# Patient Record
(demographics unavailable — no encounter records)

---

## 2018-05-21 NOTE — EDM.PDOC
ED HPI GENERAL MEDICAL PROBLEM





- General


Chief Complaint: Respiratory Problem


Stated Complaint: CHEST CONGESTION AND ASTHMA


Time Seen by Provider: 05/21/18 08:37





- History of Present Illness


INITIAL COMMENTS - FREE TEXT/NARRATIVE: 





74-year-old male presents to the emergency room with a worsening cough.





Patient is a worsening cough over the last 5-6 days. He is bringing up 

increasing amounts of sputum. The patient is concerned as he has a history of 

asthma he uses Flovent as directed. He is not on any bronchodilator therapy. He 

denies any fevers chills. He thinks his asthma is fairly stable at this point. 

He denies any other symptoms at this point he is not having chest pain chest 

pressure. He denies any abdominal pain nausea vomiting constipation diarrhea.








- Related Data


 Allergies











Allergy/AdvReac Type Severity Reaction Status Date / Time


 


No Known Allergies Allergy   Verified 05/21/18 08:29











Home Meds: 


 Home Meds





Albuterol [Proventil HFA] 200 puff INH Q4H #1 inhaler 05/21/18 [Rx]


Doxycycline [Vibramycin] 100 mg PO BID #20 cap 05/21/18 [Rx]


Fluticasone Propionate [Flovent  MCG] 1 puff INH BID 05/21/18 [History]


Ibuprofen [Motrin] 400 - 600 mg PO BEDTIME PRN 05/21/18 [History]











Social & Family History





- Tobacco Use


Smoking Status *Q: Former Smoker


Years of Tobacco use: 10


Packs/Tins Daily: 0.5


Used Tobacco, but Quit: No





- Caffeine Use


Caffeine Use: Reports: None, Coffee





- Alcohol Use


Days Per Week of Alcohol Use: 7


Number of Drinks Per Day: 1


Total Drinks Per Week: 7





- Recreational Drug Use


Recreational Drug Use: No





ED ROS GENERAL





- Review of Systems


Review Of Systems: See Below


Constitutional: Denies: Fever, Chills


HEENT: Reports: No Symptoms


Respiratory: Reports: Cough, Sputum


Cardiovascular: Reports: No Symptoms


GI/Abdominal: Reports: No Symptoms


: Reports: No Symptoms





ED EXAM, GENERAL





- Physical Exam


Exam: See Below


Exam Limited By: No Limitations


General Appearance: Alert, No Apparent Distress


Eye Exam: Bilateral Eye: Normal Inspection


Ears: Normal External Exam, Normal Canal, Hearing Grossly Normal, Normal TMs


Nose: Normal Inspection, Normal Mucosa, No Blood


Throat/Mouth: Normal Inspection, Normal Lips, Normal Teeth, Normal Gums, Normal 

Oropharynx, Normal Voice, No Airway Compromise


Head: Atraumatic, Normocephalic


Neck: Normal Inspection, Supple, Non-Tender, Full Range of Motion


Respiratory/Chest: No Respiratory Distress, Lungs Clear, Normal Breath Sounds


Cardiovascular: Normal Peripheral Pulses, Regular Rate, Rhythm, No Edema, No 

Gallop, No JVD, No Murmur, No Rub


GI/Abdominal: Normal Bowel Sounds, Soft, Non-Tender


Extremities: Normal Inspection, Normal Range of Motion, Non-Tender, No Pedal 

Edema


Neurological: Alert, Oriented


Skin Exam: Warm, Dry, Intact





Course





- Vital Signs


Last Recorded V/S: 


 Last Vital Signs











Temp  36.8 C   05/21/18 08:25


 


Pulse  89   05/21/18 08:25


 


Resp  19   05/21/18 08:25


 


BP  176/106 H  05/21/18 08:25


 


Pulse Ox  96   05/21/18 09:16














- Orders/Labs/Meds


Orders: 


 Active Orders 24 hr











 Category Date Time Status


 


 RT Aerosol Therapy [RC] ASDIRECTED Care  05/21/18 08:52 Active


 


 Chest 2V [CR] Stat Exams  05/21/18 08:51 Taken











Meds: 


Medications














Discontinued Medications














Generic Name Dose Route Start Last Admin





  Trade Name Freq  PRN Reason Stop Dose Admin


 


Albuterol/Ipratropium  3 ml  05/21/18 08:51  05/21/18 09:16





  Duoneb 3.0-0.5 Mg/3 Ml  NEB  05/21/18 08:52  3 ml





  ONETIME ONE   Administration





     





     





     





     














- Re-Assessments/Exams


Free Text/Narrative Re-Assessment/Exam: 





05/21/18 09:30


Chest x-ray was obtained which shows slight rotation mild hyperinflation what 

looks like a developing right middle lobe infiltrate right hemidiaphragm is 

slightly elevated compared to the left he has some developing degenerative 

changes noted in the spine.





Patient doing much better after the nebulizer treatment. Repeat auscultation of 

the lungs reveals increased air movement no wheezes crackles or rhonchi.








Patient be discged on doxycycline and albuterol MDI














Departure





- Departure


Time of Disposition: 09:35


Disposition: Home, Self-Care 01


Clinical Impression: 


 Pneumonia








- Discharge Information


Prescriptions: 


Albuterol [Proventil HFA] 200 puff INH Q4H #1 inhaler


Doxycycline [Vibramycin] 100 mg PO BID #20 cap


Referrals: 


Willie Vincent Jr, MD [Primary Care Provider] - 


Forms:  ED Department Discharge


Additional Instructions: 


Return to the emergency room with any questions problems worsening symptoms.





Use the inhaler the antibiotics as directed. Use the inhaler for at least one 

week after you finish the antibiotics





Follow-up with regular physician at the end of this week for recheck.











- My Orders


Last 24 Hours: 


My Active Orders





05/21/18 08:51


Chest 2V [CR] Stat 





05/21/18 08:52


RT Aerosol Therapy [RC] ASDIRECTED 














- Assessment/Plan


Last 24 Hours: 


My Active Orders





05/21/18 08:51


Chest 2V [CR] Stat 





05/21/18 08:52


RT Aerosol Therapy [RC] ASDIRECTED

## 2018-05-22 NOTE — CR
Chest: Two views of the chest were obtained.

 

Comparison: Previous chest x-ray of 08/26/12.

 

Heart size is normal.  Tortuous thoracic aorta is seen.  Lungs are 

clear with no acute parenchymal change.  Bony structures show 

degenerative spurring within the spine.  Mild compression deformity is

 noted within the upper thoracic spine which is old.  Diaphragms are 

flattened on the lateral view compatible with emphysematous change.

 

Impression:

1.  Emphysematous change.  Other incidental findings.

2.  Nothing acute is appreciated.

 

Diagnostic code #2

## 2020-05-15 NOTE — EDM.PDOC
ED HPI GENERAL MEDICAL PROBLEM





- General


Chief Complaint: Skin Complaint


Stated Complaint: ITCHING WITH SORES


Time Seen by Provider: 05/15/20 11:16


Source of Information: Reports: Patient, RN Notes Reviewed


History Limitations: Reports: No Limitations





- History of Present Illness


INITIAL COMMENTS - FREE TEXT/NARRATIVE: 





Patient is a 76-year-old male who presents to the ED for a skin issue.  Patient 

states for the last 8 months he has had issues with an allover skin rash with 

eruptions.  He states that the rash itself is very very itchy, and is all over, 

this does spare his palms and soles of his feet, and apparently his groin area.

  He notes that he is seen 4 different dermatologists in [place], that have 

given him different creams to try to clear this up however this is not 

providing much relief.  Patient notes that if he gets warm, or heat is applied 

to the areas they seem to be more painful, and get more itchy.  There are 

multiple scab-like lesions throughout his entire body surface, bilateral upper 

extremities, lower legs, chest, back.  Multiple excoriation marks are noted 

throughout the skin surface.  The patient himself denies any sort of fever/

chills, or any other sick-like symptoms, cough/shortness of breath, chest pain, 

otherwise.  He states that most of his concern is the itching and spread of the 

rash.  His primary care provider is Dr. Vincent.





- Related Data


 Allergies











Allergy/AdvReac Type Severity Reaction Status Date / Time


 


No Known Allergies Allergy   Verified 05/15/20 11:28











Home Meds: 


 Home Meds





Albuterol [Proventil HFA] 200 puff INH Q4H #1 inhaler 05/21/18 [Rx]


Doxycycline [Vibramycin] 100 mg PO BID #20 cap 05/21/18 [Rx]


Fluticasone Propionate [Flovent  MCG] 1 puff INH BID 05/21/18 [History]


Ibuprofen [Motrin] 400 - 600 mg PO BEDTIME PRN 05/21/18 [History]


predniSONE 20 mg PO ASDIRECTED #15 tab 05/15/20 [Rx]











Past Medical History


HEENT History: Reports: Allergic Rhinitis


Respiratory History: Reports: Asthma


Gastrointestinal History: Reports: Other (See Below)


Psychiatric History: Reports: Anxiety





- Past Surgical History


Male  Surgical History: Reports: Prostatectomy





Social & Family History





- Tobacco Use


Smoking Status *Q: Never Smoker





- Caffeine Use


Caffeine Use: Reports: None





- Recreational Drug Use


Recreational Drug Use: No





ED ROS GENERAL





- Review of Systems


Review Of Systems: Comprehensive ROS is negative, except as noted in HPI.





ED EXAM, SKIN/RASH


Exam: See Below


Exam Limited By: No Limitations


General Appearance: Alert, WD/WN, No Apparent Distress


Eye Exam: Bilateral Eye: Normal Inspection


Ears: Normal External Exam


Nose: Normal Inspection


Throat/Mouth: Normal Inspection, Normal Lips, Normal Teeth, Normal Gums, Normal 

Oropharynx, Normal Voice, No Airway Compromise


Head: Atraumatic, Normocephalic


Neck: Normal Inspection, Supple


Respiratory/Chest: No Respiratory Distress, Lungs Clear, Normal Breath Sounds, 

No Accessory Muscle Use, Chest Non-Tender


Cardiovascular: Normal Peripheral Pulses, Regular Rate, Rhythm, No Murmur


GI/Abdominal: Normal Bowel Sounds, Soft, Non-Tender, No Distention, No Mass


Extremities: Normal Range of Motion, Normal Capillary Refill, Arm Pain (with 

pruritis), Leg Pain (with pruritis), Increased Warmth, Redness


Neurological: Alert, Oriented, Normal Cognition, No Motor/Sensory Deficits


Psychiatric: Normal Affect, Normal Mood


Skin: Warm, Dry


Location, Skin: Neck, Chest, Abdomen, Back, Upper Extremity, Right, Upper 

Extremity, Left, Lower Extremity, Right, Lower Extremity, Left, Generalized.  No

: Palms, Soles, Axillary, Groin


Characteristics: Maculopapular, Fine, Urticarial, Erythematous


Associated features: Crusting (multiple open sores in different stages of 

healing throughout entire body surface noted, excoriation marks noted in 

multiple areas as well.).  No: Scaling





Course





- Vital Signs


Last Recorded V/S: 


 Last Vital Signs











Temp  98.5 F   05/15/20 11:18


 


Pulse  95   05/15/20 11:18


 


Resp  16   05/15/20 11:18


 


BP  185/116 H  05/15/20 11:18


 


Pulse Ox  95   05/15/20 11:18














- Orders/Labs/Meds


Orders: 


 Active Orders 24 hr











 Category Date Time Status


 


 CBC WITH MANUAL DIFF [HEME] Stat Lab  05/15/20 12:06 Received











Labs: 


 Laboratory Tests











  05/15/20 05/15/20 Range/Units





  12:06 12:06 


 


WBC  6.56   (4.23-9.07)  K/mm3


 


RBC  5.43   (4.63-6.08)  M/mm3


 


Hgb  16.2   (13.7-17.5)  gm/dl


 


Hct  50.3   (40.1-51.0)  %


 


MCV  92.6 H   (79.0-92.2)  fl


 


MCH  29.8   (25.7-32.2)  pg


 


MCHC  32.2   (32.2-35.5)  g/dl


 


RDW Std Deviation  46.9 H   (35.1-43.9)  fL


 


Plt Count  230   (163-337)  K/mm3


 


MPV  10.0   (9.4-12.3)  fl


 


Sodium   141  (136-145)  mEq/L


 


Potassium   4.0  (3.5-5.1)  mEq/L


 


Chloride   106  ()  mEq/L


 


Carbon Dioxide   25  (21-32)  mEq/L


 


Anion Gap   14.0  (5-15)  


 


BUN   23 H  (7-18)  mg/dL


 


Creatinine   0.8  (0.7-1.3)  mg/dL


 


Est Cr Clr Drug Dosing   81.11  mL/min


 


Estimated GFR (MDRD)   > 60  (>60)  mL/min


 


BUN/Creatinine Ratio   28.8 H  (14-18)  


 


Glucose   107  ()  mg/dL


 


Calcium   9.2  (8.5-10.1)  mg/dL


 


Total Bilirubin   0.4  (0.2-1.0)  mg/dL


 


AST   21  (15-37)  U/L


 


ALT   29  (16-63)  U/L


 


Alkaline Phosphatase   121 H  ()  U/L


 


Total Protein   7.5  (6.4-8.2)  g/dl


 


Albumin   3.5  (3.4-5.0)  g/dl


 


Globulin   4.0  gm/dL


 


Albumin/Globulin Ratio   0.9 L  (1-2)  














- Re-Assessments/Exams


Free Text/Narrative Re-Assessment/Exam: 





05/15/20 11:44


Patient presents to the ED for his skin complaint.  Unsure as to the etiology 

of this rash.  Patient states he was told by the dermatologists that he had 

"human scabies".  There are no marks on his hands and/or feet that would 

suggest scabies at this time.  The rash is fairly generalized, very pruritic, 

with multiple open sores.  Have ordered CBC and CMP for initial evaluation, 

suspect underlying cellulitis with possible allover inflammatory type reaction.

  We will likely try to send the patient home with prednisone, and or a course 

of antibiotics pending lab results.  Will strongly recommend he follow-up with 

his regular provider and get a referral for a different dermatologist or 

otherwise.





05/15/20 12:51


Patient CBC demonstrates no sign of an obvious infection.  Metabolic is still 

pending.





05/15/20 13:06


Metabolic panel has returned and is essentially unremarkable, or would not 

demonstrate any cause of the itching/rash. Will discharge home with prednisone 

and recommendation for follow up with primary care.





Departure





- Departure


Time of Disposition: 12:58


Disposition: Home, Self-Care 01


Condition: Good


Clinical Impression: 


 Pruritic rash





Atopic dermatitis


Qualifiers:


 Atopic dermatitis type: other Qualified Code(s): L20.89 - Other atopic 

dermatitis








- Discharge Information


*PRESCRIPTION DRUG MONITORING PROGRAM REVIEWED*: No


*COPY OF PRESCRIPTION DRUG MONITORING REPORT IN PATIENT [place]: No


Prescriptions: 


predniSONE 20 mg PO ASDIRECTED #15 tab


Instructions:  Rash, Adult, Easy-to-Read


Referrals: 


Willie Vincent Jr, MD [Primary Care Provider] - 


Forms:  ED Department Discharge


Additional Instructions: 


You were seen in the ED today for your all over body rash.





The cause of the rash is still pretty unclear at today's visit.  However he did 

have some lab work taken and there does not appear to be any sort of underlying 

bacterial infection.





You have been started on a course of prednisone for the itching and to help 

calm down the rash.  Please take as prescribed this was electronically 

prescribed to the [place] pharmacy located in the [facility] grocery store.  You can 

start taking this tonight.





Highly recommend you follow-up with your primary care provider for a referral 

to dermatology for further care and treatment.





You may also try over-the-counter Benadryl for further itching, if 1 full tab 

makes you too sleepy, you can try half a tablet every 4-6 hours.





Please return to the ER at any time if symptoms should change or worsen.





Sepsis Event Note





- Evaluation


Sepsis Screening Result: No Definite Risk





- Focused Exam


Vital Signs: 


 Vital Signs











  Temp Pulse Resp BP Pulse Ox


 


 05/15/20 11:18  98.5 F  95  16  185/116 H  95











Date Exam was Performed: 05/15/20


Time Exam was Performed: 13:06





- My Orders


Last 24 Hours: 


My Active Orders





05/15/20 12:06


CBC WITH MANUAL DIFF [HEME] Stat 














- Assessment/Plan


Last 24 Hours: 


My Active Orders





05/15/20 12:06


CBC WITH MANUAL DIFF [HEME] Stat

## 2020-10-22 NOTE — PCM.HP.2
H&P History of Present Illness





- General


Date of Service: 10/22/20


Admit Problem/Dx: 


                           Admission Diagnosis/Problem





Admission Diagnosis/Problem      Pneumonia








Source of Information: Patient


History Limitations: Reports: No Limitations





- History of Present Illness


Initial Comments - Free Text/Narative: 





Patient is a 77-year-old gentleman who has presented to the emergency department

with a complaint of coughing, shortness of breath and he was concerned about 

having COVID-19.  The patient reports that approximately 3 days ago he started 

feeling sick with fever, increasing cough and shortness of breath.  The patient 

has been in his usual state of health.  He does not take any medication 

chronically except for inhalers.  Patient does have medications that he takes 

for bullous pemphigus but is not listed on his medication list.  Patient also 

has a history of prostate cancer with radical prostatectomy.  Patient has no 

other complaints today.


Onset of Symptoms: Reports: Gradual


Duration of Symptoms: Reports: Day(s):, Getting Worse


Location: Reports: Generalized


Quality: Reports: Ache


Severity: Mild


Improves with: Reports: Rest


Worsens with: Reports: Breathing


Context: Reports: Sick Contact


Associated Symptoms: Reports: cough w sputum, Fever/Chills, Headaches





- Related Data


Allergies/Adverse Reactions: 


                                    Allergies











Allergy/AdvReac Type Severity Reaction Status Date / Time


 


No Known Allergies Allergy   Verified 05/15/20 11:28











Home Medications: 


                                    Home Meds





Fluticasone Propionate [Flovent  MCG] 1 puff INH BID 05/21/18 [History]


Albuterol [Proventil HFA] 1 puff INH Q4H 10/22/20 [History]


Azithromycin [Zithromax] 250 mg PO DAILY #6 tab 10/22/20 [Rx]











Past Medical History


HEENT History: Reports: Allergic Rhinitis


Respiratory History: Reports: Asthma


Gastrointestinal History: Reports: Other (See Below)


Genitourinary History: Reports: Other (See Below)


Other Genitourinary History: prostate surgery


Psychiatric History: Reports: Anxiety


Other Psychiatric History: utilizes chewing tobacco


Oncologic (Cancer) History: Reports: Prostate


Dermatologic History: Reports: Other (See Below)


Other Dermatologic History: [email or web address]





- Past Surgical History


Male  Surgical History: Reports: Prostatectomy


Dermatological Surgical History: Reports: Skin Biopsy, Skin Graft





Social & Family History





- Family History


Family Medical History: Noncontributory





- Tobacco Use


Tobacco Use Status *Q: Current Every Day Tobacco User


Tobacco Use Within Last Twelve Months: Smokeless Tobacco


Years of Tobacco use: 47


Packs/Tins Daily: 0.5


Used Tobacco, but Quit: No





- Caffeine Use


Caffeine Use: Reports: None





- Alcohol Use


Date of Last Drink: 10/21/20


Time of Last Drink: 20:00





- Living Situation & Occupation


Living situation: Reports: , with Spouse


Occupation: Retired





H&P Review of Systems





- Review of Systems:


Review Of Systems: See Below


General: Reports: Fever, Malaise, Weakness


HEENT: Reports: No Symptoms


Pulmonary: Reports: Shortness of Breath, Wheezing, Cough, Sputum


Cardiovascular: Reports: Dyspnea on Exertion


Gastrointestinal: Reports: No Symptoms


Genitourinary: Reports: No Symptoms


Musculoskeletal: Reports: No Symptoms


Skin: Reports: No Symptoms


Psychiatric: Reports: No Symptoms


Neurological: Reports: No Symptoms


Hematologic/Lymphatic: Reports: No Symptoms


Immunologic: Reports: No Symptoms





Exam





- Exam


Exam: See Below





- Vital Signs


Vital Signs: 


                                Last Vital Signs











Temp  37.1 C   10/22/20 16:04


 


Pulse  96   10/22/20 16:04


 


Resp  40 H  10/22/20 16:04


 


BP  147/83 H  10/22/20 16:04


 


Pulse Ox  91 L  10/22/20 16:04











Weight: 90.718 kg





- Exam


Quality Assessment: Supplemental Oxygen, DVT Prophylaxis


General: Alert, Oriented, Cooperative


HEENT: Conjunctiva Clear, EACs Clear, EOMI, Hearing Intact, Mucosa Moist & Pink


Neck: Supple, Trachea Midline


Lungs: Normal Respiratory Effort, Rales (Bibasilar, predominantly on right side)


Cardiovascular: Regular Rate, Regular Rhythm


GI/Abdominal Exam: Normal Bowel Sounds, Soft, Non-Tender, No Distention


 (Male) Exam: Deferred


Rectal (Males) Exam: Deferred


Back Exam: Normal Inspection, Full Range of Motion


Extremities: Normal Inspection, Normal Range of Motion, No Pedal Edema


Skin: Warm, Rash


Neurological: Cranial Nerves Intact


Neuro Extensive - Mental Status: Alert, Oriented x3


Psychiatric: Alert, Normal Affect, Normal Mood





- Patient Data


Lab Results Last 24 hrs: 


                         Laboratory Results - last 24 hr











  10/22/20 10/22/20 10/22/20 Range/Units





  13:00 13:00 13:00 


 


WBC  15.86 H    (4.23-9.07)  K/mm3


 


RBC  5.15    (4.63-6.08)  M/mm3


 


Hgb  15.2    (13.7-17.5)  gm/dl


 


Hct  48.6    (40.1-51.0)  %


 


MCV  94.4 H    (79.0-92.2)  fl


 


MCH  29.5    (25.7-32.2)  pg


 


MCHC  31.3 L    (32.2-35.5)  g/dl


 


RDW Std Deviation  46.1 H    (35.1-43.9)  fL


 


Plt Count  279    (163-337)  K/mm3


 


MPV  10.0    (9.4-12.3)  fl


 


Neut % (Auto)  86.0 H    (34.0-67.9)  %


 


Lymph % (Auto)  5.5 L    (21.8-53.1)  %


 


Mono % (Auto)  8.1    (5.3-12.2)  %


 


Eos % (Auto)  0 L    (0.8-7.0)  


 


Baso % (Auto)  0.2    (0.1-1.2)  %


 


Neut # (Auto)  13.64 H    (1.78-5.38)  K/mm3


 


Lymph # (Auto)  0.88 L    (1.32-3.57)  K/mm3


 


Mono # (Auto)  1.28 H    (0.30-0.82)  K/mm3


 


Eos # (Auto)  0.00 L    (0.04-0.54)  K/mm3


 


Baso # (Auto)  0.03    (0.01-0.08)  K/mm3


 


Manual Slide Review  Abnormal smear    


 


PT   10.9   (9.7-11.7)  SECONDS


 


INR   1.02   


 


APTT   26   (22-31)  SECONDS


 


D-Dimer, Quantitative   1.79 H   (0.19-0.50)  mg/L


 


Sodium    142  (136-145)  mEq/L


 


Potassium    3.8  (3.5-5.1)  mEq/L


 


Chloride    105  ()  mEq/L


 


Carbon Dioxide    25  (21-32)  mEq/L


 


Anion Gap    15.8 H  (5-15)  


 


BUN    11  (7-18)  mg/dL


 


Creatinine    0.8  (0.7-1.3)  mg/dL


 


Est Cr Clr Drug Dosing    79.84  mL/min


 


Estimated GFR (MDRD)    > 60  (>60)  mL/min


 


BUN/Creatinine Ratio    13.8 L  (14-18)  


 


Glucose    120 H  ()  mg/dL


 


Lactic Acid     (0.4-2.0)  mmol/L


 


Calcium    9.3  (8.5-10.1)  mg/dL


 


Ferritin     ()  ng/ml


 


Total Bilirubin    0.7  (0.2-1.0)  mg/dL


 


AST    19  (15-37)  U/L


 


ALT    40  (16-63)  U/L


 


Alkaline Phosphatase    162 H  ()  U/L


 


Lactate Dehydrogenase    144  ()  U/L


 


C-Reactive Protein    5.8 H*  (<1.0)  mg/dL


 


Total Protein    7.6  (6.4-8.2)  g/dl


 


Albumin    3.6  (3.4-5.0)  g/dl


 


Globulin    4.0  gm/dL


 


Albumin/Globulin Ratio    0.9 L  (1-2)  


 


SARS-CoV-2 RNA (DANYELL)     (NEGATIVE)  














  10/22/20 10/22/20 10/22/20 Range/Units





  13:00 13:00 13:00 


 


WBC     (4.23-9.07)  K/mm3


 


RBC     (4.63-6.08)  M/mm3


 


Hgb     (13.7-17.5)  gm/dl


 


Hct     (40.1-51.0)  %


 


MCV     (79.0-92.2)  fl


 


MCH     (25.7-32.2)  pg


 


MCHC     (32.2-35.5)  g/dl


 


RDW Std Deviation     (35.1-43.9)  fL


 


Plt Count     (163-337)  K/mm3


 


MPV     (9.4-12.3)  fl


 


Neut % (Auto)     (34.0-67.9)  %


 


Lymph % (Auto)     (21.8-53.1)  %


 


Mono % (Auto)     (5.3-12.2)  %


 


Eos % (Auto)     (0.8-7.0)  


 


Baso % (Auto)     (0.1-1.2)  %


 


Neut # (Auto)     (1.78-5.38)  K/mm3


 


Lymph # (Auto)     (1.32-3.57)  K/mm3


 


Mono # (Auto)     (0.30-0.82)  K/mm3


 


Eos # (Auto)     (0.04-0.54)  K/mm3


 


Baso # (Auto)     (0.01-0.08)  K/mm3


 


Manual Slide Review     


 


PT     (9.7-11.7)  SECONDS


 


INR     


 


APTT     (22-31)  SECONDS


 


D-Dimer, Quantitative     (0.19-0.50)  mg/L


 


Sodium     (136-145)  mEq/L


 


Potassium     (3.5-5.1)  mEq/L


 


Chloride     ()  mEq/L


 


Carbon Dioxide     (21-32)  mEq/L


 


Anion Gap     (5-15)  


 


BUN     (7-18)  mg/dL


 


Creatinine     (0.7-1.3)  mg/dL


 


Est Cr Clr Drug Dosing     mL/min


 


Estimated GFR (MDRD)     (>60)  mL/min


 


BUN/Creatinine Ratio     (14-18)  


 


Glucose     ()  mg/dL


 


Lactic Acid  1.4    (0.4-2.0)  mmol/L


 


Calcium     (8.5-10.1)  mg/dL


 


Ferritin    324  ()  ng/ml


 


Total Bilirubin     (0.2-1.0)  mg/dL


 


AST     (15-37)  U/L


 


ALT     (16-63)  U/L


 


Alkaline Phosphatase     ()  U/L


 


Lactate Dehydrogenase     ()  U/L


 


C-Reactive Protein     (<1.0)  mg/dL


 


Total Protein     (6.4-8.2)  g/dl


 


Albumin     (3.4-5.0)  g/dl


 


Globulin     gm/dL


 


Albumin/Globulin Ratio     (1-2)  


 


SARS-CoV-2 RNA (DANYELL)   Negative   (NEGATIVE)  











Result Diagrams: 


                                 10/22/20 13:00





                                 10/22/20 13:00





Sepsis Event Note





- Evaluation


Sepsis Screening Result: No Definite Risk





- Focused Exam


Vital Signs: 


                                   Vital Signs











  Temp Pulse Resp BP Pulse Ox


 


 10/22/20 16:04  37.1 C  96  40 H  147/83 H  91 L


 


 10/22/20 15:34  36.8 C  90  20  147/83 H  96


 


 10/22/20 11:58  36.7 C  98  29 H  160/94 H  92 L














- Problem List


(1) Acute and chronic respiratory failure


SNOMED Code(s): 45573186


   ICD Code: J96.20 - ACUTE AND CHR RESP FAILURE, UNSP W HYPOXIA OR HYPERCAPNIA 

  Status: Chronic   Priority: High   Current Visit: Yes   


Qualifiers: 


   Respiratory failure complication: hypoxia   Qualified Code(s): J96.21 - Acute

 and chronic respiratory failure with hypoxia   





(2) Chronic bronchitis with acute exacerbation


SNOMED Code(s): 460442692


   ICD Code: J20.9 - ACUTE BRONCHITIS, UNSPECIFIED; J42 - UNSPECIFIED CHRONIC 

BRONCHITIS   Status: Acute   Priority: High   Current Visit: Yes   





(3) Pneumonia


SNOMED Code(s): 326767411


   ICD Code: J18.9 - PNEUMONIA, UNSPECIFIED ORGANISM   Status: Acute   Priority:

 High   Current Visit: Yes   


Qualifiers: 


   Pneumonia type: due to unspecified organism   Laterality: right   Lung 

location: unspecified part of lung   Qualified Code(s): J18.9 - Pneumonia, 

unspecified organism   





(4) Smokeless tobacco use


SNOMED Code(s): 233516831


   ICD Code: Z72.0 - TOBACCO USE   Status: Chronic   Priority: Medium   Current 

Visit: Yes   


Problem List Initiated/Reviewed/Updated: Yes


Orders Last 24hrs: 


                               Active Orders 24 hr











 Category Date Time Status


 


 Patient Status [ADT] Routine ADT  10/22/20 16:54 Ordered


 


 Cardiac Monitoring [RC] .AS DIRECTED Care  10/22/20 12:29 Active


 


 Cardiac Monitoring [RC] INTERMITTENT Care  10/22/20 16:57 Ordered


 


 Oxygen Therapy [RC] PRN Care  10/22/20 12:29 Active


 


 Oxygen Therapy [RC] PRN Care  10/22/20 16:54 Ordered


 


 Peripheral IV Care [RC] .AS DIRECTED Care  10/22/20 12:30 Active


 


 RT Aerosol Therapy [RC] ASDIRECTED Care  10/22/20 16:59 Ordered


 


 RT Post Treatment Assessment [RC] Click to Edit Care  10/22/20 12:31 Active


 


 RT Pre-Treatment Assessment [RC] Click to Edit Care  10/22/20 12:31 Active


 


 Up With Assistance [RC] ASDIRECTED Care  10/22/20 16:54 Ordered


 


 VTE/DVT Education [RC] PER UNIT ROUTINE Care  10/22/20 16:54 Ordered


 


 Vital Signs [RC] Q4H Care  10/22/20 16:54 Ordered


 


 OT Evaluation and Treatment [CONS] Routine Cons  10/22/20 16:54 Ordered


 


 PT Evaluation and Treatment [CONS] Routine Cons  10/22/20 16:54 Ordered


 


 Regular Diet [DIET] Diet  10/22/20 Dinner Ordered


 


 Ang Chest [CT] Stat Exams  10/22/20 14:12 Taken


 


 Chest 1V Frontal [CR] Stat Exams  10/22/20 12:30 Taken


 


 CBC WITH AUTO DIFF [HEME] AM Lab  10/23/20 05:11 Ordered


 


 COMPREHENSIVE METABOLIC PN,CMP [CHEM] AM Lab  10/23/20 05:11 Ordered


 


 CULTURE SPUTUM + SMEAR [RM] Stat Lab  10/22/20 16:54 Ordered


 


 MAGNESIUM [CHEM] AM Lab  10/23/20 05:11 Ordered


 


 PHOSPHORUS [CHEM] AM Lab  10/23/20 05:11 Ordered


 


 Acetaminophen [TylenoL] Med  10/22/20 16:54 Ordered





 650 mg PO Q4H PRN   


 


 Albuterol/Ipratropium [DuoNeb 3.0-0.5 MG/3 ML] Med  10/22/20 16:54 Ordered





 3 ml NEB Q4H PRN   


 


 Azithromycin [Zithromax] Med  10/22/20 17:00 Ordered





 500 mg IV Q24H   


 


 Docusate Sodium [Colace] Med  10/22/20 16:54 Ordered





 100 mg PO BID PRN   


 


 Enoxaparin [Lovenox] Med  10/22/20 17:00 Ordered





 40 mg SUBCUT DAILY   


 


 Hydrocortisone Sod Succinate [Solu-CORTEF] Med  10/22/20 17:00 Ordered





 125 mg IVPUSH Q8H   


 


 Nicotine [Habitrol] Med  10/23/20 09:00 Ordered





 14 mg TRDERM DAILY   


 


 Sodium Chloride 0.9% [Normal Saline] 1,000 ml Med  10/22/20 17:00 Ordered





 IV ASDIRECTED   


 


 Sodium Chloride 0.9% [Normal Saline] 100 ml Med  10/22/20 14:15 Active





 IV ASDIRECTED   


 


 Sodium Chloride 0.9% [Saline Flush] Med  10/22/20 12:29 Active





 10 ml FLUSH ASDIRECTED PRN   


 


 Sodium Chloride 0.9% [Saline Flush] Med  10/22/20 14:15 Active





 10 ml FLUSH ONETIME PRN   


 


 Temazepam [Restoril] Med  10/22/20 16:54 Ordered





 15 mg PO BEDTIME PRN   


 


 oxyCODONE Med  10/22/20 16:54 Ordered





 5 mg PO Q4H PRN   


 


 Peripheral IV Insertion Adult [OM.PC] Stat Oth  10/22/20 12:29 Ordered


 


 Resuscitation Status Routine Resus Stat  10/22/20 16:54 Ordered








                                Medication Orders





Sodium Chloride (Normal Saline)  100 mls @ 75 mls/hr IV ASDIRECTED LIZ


   Last Admin: 10/22/20 14:36  Dose: 75 mls/hr


   Documented by: DENA


Sodium Chloride (Saline Flush)  10 ml FLUSH ASDIRECTED PRN


   PRN Reason: Keep Vein Open


   Last Admin: 10/22/20 14:36  Dose: 10 ml


   Documented by: DENA


   Admin: 10/22/20 13:55  Dose: 10 ml


   Documented by: DESTINY


Sodium Chloride (Saline Flush)  10 ml FLUSH ONETIME PRN


   PRN Reason: IV FLUSH


   Last Admin: 10/22/20 13:00  Dose: 10 ml


   Documented by: DESTINY








Assessment/Plan Comment:: 





The patient is a 77-year-old gentleman who will be admitted as an inpatient due 

to his pneumonia as well as acute on chronic respiratory failure.  The patient 

will have oxygen support to help keep his saturations above 92%.  I have also 

ordered albuterol Atrovent nebulizers every 2 hours when wheezing.  The patient 

also be kept on IV fluids at 75 mL/h.  Patient also has been placed on Solu-

Medrol  mg every 8 hours.  The patient will also have a azithromycin 500 

mg IV on a daily basis.  I have also strongly counseled the patient with regards

 to smokeless tobacco usage.  He has been offered nicotine patch as needed.  

Patient has refused this.  PT OT is also been ordered for the patient.  Repeat 

laboratory studies have been ordered for the morning.  Patient has been 

encouraged to ambulate.  He will have a regular diet as tolerated.  Patient sh

ould be appropriate for discharge in 2 to 3 days.  The patient is COVID-19 

negative.





- Mortality Measure


Prognosis:: Good

## 2020-10-22 NOTE — EDM.PDOC
ED HPI GENERAL MEDICAL PROBLEM





- General


Chief Complaint: Respiratory Problem


Stated Complaint: COVID SX


Time Seen by Provider: 10/22/20 12:00


Source of Information: Reports: Patient


History Limitations: Reports: No Limitations





- History of Present Illness


INITIAL COMMENTS - FREE TEXT/NARRATIVE: 





The patient presents with a fever, cough, congestion, runny nose and shortness 

of breath.  This has been going on since Monday.  He has a history of chronic 

bronchitis and he is on inhalers for that.  He still smokes.  He has no chest 

pain but he is short of breath with exertion.  He has no abdominal pain, nausea 

or vomiting.  He went to the clinic and they sent him over to be evaluated.  He 

has no cardiac history or hypertension.


Onset: Gradual


Duration: Day(s):


Severity: Moderate


Improves with: Reports: None


Worsens with: Reports: None


Associated Symptoms: Reports: Cough, Fever/Chills, Shortness of Breath.  Denies:

Chest Pain, Headaches, Nausea/Vomiting


Treatments PTA: Reports: Other (see below)


Other Treatments PTA: nyquil last night





- Related Data


                                    Allergies











Allergy/AdvReac Type Severity Reaction Status Date / Time


 


No Known Allergies Allergy   Verified 05/15/20 11:28











Home Meds: 


                                    Home Meds





Fluticasone Propionate [Flovent  MCG] 1 puff INH BID 05/21/18 [History]


Albuterol [Proventil HFA] 1 puff INH Q4H 10/22/20 [History]


Azithromycin [Zithromax] 250 mg PO DAILY #6 tab 10/22/20 [Rx]











Past Medical History


HEENT History: Reports: Allergic Rhinitis


Respiratory History: Reports: Asthma


Gastrointestinal History: Reports: Other (See Below)


Genitourinary History: Reports: Other (See Below)


Other Genitourinary History: prostate surgery


Psychiatric History: Reports: Anxiety


Other Psychiatric History: utilizes chewing tobacco


Oncologic (Cancer) History: Reports: Prostate


Dermatologic History: Reports: Other (See Below)


Other Dermatologic History: [email or web address]





- Past Surgical History


Male  Surgical History: Reports: Prostatectomy


Dermatological Surgical History: Reports: Skin Biopsy, Skin Graft





Social & Family History





- Family History


Family Medical History: Noncontributory





- Tobacco Use


Tobacco Use Status *Q: Current Every Day Tobacco User


Years of Tobacco use: 47


Packs/Tins Daily: 0.5


Used Tobacco, but Quit: No





- Caffeine Use


Caffeine Use: Reports: None





- Alcohol Use


Date of Last Drink: 10/21/20


Time of Last Drink: 20:00





ED ROS GENERAL





- Review of Systems


Review Of Systems: See Below


Constitutional: Reports: Fever, Chills


HEENT: Reports: No Symptoms


Respiratory: Reports: Shortness of Breath, Cough


Cardiovascular: Reports: No Symptoms


Endocrine: Reports: No Symptoms


GI/Abdominal: Reports: No Symptoms


: Reports: No Symptoms


Musculoskeletal: Reports: No Symptoms





ED EXAM, GENERAL





- Physical Exam


Exam: See Below


Exam Limited By: No Limitations


General Appearance: Alert, No Apparent Distress


Ears: Normal External Exam


Nose: Normal Inspection


Head: Atraumatic, Normocephalic


Neck: Normal Inspection


Respiratory/Chest: No Respiratory Distress, Rhonchi, Wheezing


Cardiovascular: Regular Rate, Rhythm, No Edema, No Murmur


GI/Abdominal: Soft, Non-Tender, No Mass


Back Exam: Normal Inspection


Extremities: Normal Inspection





Course





- Vital Signs


Last Recorded V/S: 


                                Last Vital Signs











Temp  98.0 F   10/22/20 11:58


 


Pulse  98   10/22/20 11:58


 


Resp  29 H  10/22/20 11:58


 


BP  160/94 H  10/22/20 11:58


 


Pulse Ox  92 L  10/22/20 11:58














- Orders/Labs/Meds


Orders: 


                               Active Orders 24 hr











 Category Date Time Status


 


 Cardiac Monitoring [RC] .AS DIRECTED Care  10/22/20 12:29 Active


 


 Oxygen Therapy [RC] PRN Care  10/22/20 12:29 Active


 


 Peripheral IV Care [RC] .AS DIRECTED Care  10/22/20 12:30 Active


 


 RT Post Treatment Assessment [RC] Click to Edit Care  10/22/20 12:31 Active


 


 RT Pre-Treatment Assessment [RC] Click to Edit Care  10/22/20 12:31 Active


 


 Ang Chest [CT] Stat Exams  10/22/20 14:12 Taken


 


 Chest 1V Frontal [CR] Stat Exams  10/22/20 12:30 Taken


 


 Sodium Chloride 0.9% [Normal Saline] 100 ml Med  10/22/20 14:15 Active





 IV ASDIRECTED   


 


 Sodium Chloride 0.9% [Saline Flush] Med  10/22/20 12:29 Active





 10 ml FLUSH ASDIRECTED PRN   


 


 Sodium Chloride 0.9% [Saline Flush] Med  10/22/20 14:15 Active





 10 ml FLUSH ONETIME PRN   


 


 Peripheral IV Insertion Adult [OM.PC] Stat Oth  10/22/20 12:29 Ordered








                                Medication Orders





Sodium Chloride (Normal Saline)  100 mls @ 75 mls/hr IV ASDIRECTED LIZ


   Last Admin: 10/22/20 14:36  Dose: 75 mls/hr


   Documented by: DENA


Sodium Chloride (Saline Flush)  10 ml FLUSH ASDIRECTED PRN


   PRN Reason: Keep Vein Open


   Last Admin: 10/22/20 14:36  Dose: 10 ml


   Documented by: DENA


   Admin: 10/22/20 13:55  Dose: 10 ml


   Documented by: DESTINY


Sodium Chloride (Saline Flush)  10 ml FLUSH ONETIME PRN


   PRN Reason: IV FLUSH


   Last Admin: 10/22/20 13:00  Dose: 10 ml


   Documented by: DESTINY








Labs: 


                                Laboratory Tests











  10/22/20 10/22/20 10/22/20 Range/Units





  13:00 13:00 13:00 


 


WBC  15.86 H    (4.23-9.07)  K/mm3


 


RBC  5.15    (4.63-6.08)  M/mm3


 


Hgb  15.2    (13.7-17.5)  gm/dl


 


Hct  48.6    (40.1-51.0)  %


 


MCV  94.4 H    (79.0-92.2)  fl


 


MCH  29.5    (25.7-32.2)  pg


 


MCHC  31.3 L    (32.2-35.5)  g/dl


 


RDW Std Deviation  46.1 H    (35.1-43.9)  fL


 


Plt Count  279    (163-337)  K/mm3


 


MPV  10.0    (9.4-12.3)  fl


 


Neut % (Auto)  86.0 H    (34.0-67.9)  %


 


Lymph % (Auto)  5.5 L    (21.8-53.1)  %


 


Mono % (Auto)  8.1    (5.3-12.2)  %


 


Eos % (Auto)  0 L    (0.8-7.0)  


 


Baso % (Auto)  0.2    (0.1-1.2)  %


 


Neut # (Auto)  13.64 H    (1.78-5.38)  K/mm3


 


Lymph # (Auto)  0.88 L    (1.32-3.57)  K/mm3


 


Mono # (Auto)  1.28 H    (0.30-0.82)  K/mm3


 


Eos # (Auto)  0.00 L    (0.04-0.54)  K/mm3


 


Baso # (Auto)  0.03    (0.01-0.08)  K/mm3


 


Manual Slide Review  Abnormal smear    


 


PT   10.9   (9.7-11.7)  SECONDS


 


INR   1.02   


 


APTT   26   (22-31)  SECONDS


 


D-Dimer, Quantitative   1.79 H   (0.19-0.50)  mg/L


 


Sodium    142  (136-145)  mEq/L


 


Potassium    3.8  (3.5-5.1)  mEq/L


 


Chloride    105  ()  mEq/L


 


Carbon Dioxide    25  (21-32)  mEq/L


 


Anion Gap    15.8 H  (5-15)  


 


BUN    11  (7-18)  mg/dL


 


Creatinine    0.8  (0.7-1.3)  mg/dL


 


Est Cr Clr Drug Dosing    79.84  mL/min


 


Estimated GFR (MDRD)    > 60  (>60)  mL/min


 


BUN/Creatinine Ratio    13.8 L  (14-18)  


 


Glucose    120 H  ()  mg/dL


 


Lactic Acid     (0.4-2.0)  mmol/L


 


Calcium    9.3  (8.5-10.1)  mg/dL


 


Ferritin     ()  ng/ml


 


Total Bilirubin    0.7  (0.2-1.0)  mg/dL


 


AST    19  (15-37)  U/L


 


ALT    40  (16-63)  U/L


 


Alkaline Phosphatase    162 H  ()  U/L


 


Lactate Dehydrogenase    144  ()  U/L


 


C-Reactive Protein    5.8 H*  (<1.0)  mg/dL


 


Total Protein    7.6  (6.4-8.2)  g/dl


 


Albumin    3.6  (3.4-5.0)  g/dl


 


Globulin    4.0  gm/dL


 


Albumin/Globulin Ratio    0.9 L  (1-2)  


 


SARS-CoV-2 RNA (DANYELL)     (NEGATIVE)  














  10/22/20 10/22/20 10/22/20 Range/Units





  13:00 13:00 13:00 


 


WBC     (4.23-9.07)  K/mm3


 


RBC     (4.63-6.08)  M/mm3


 


Hgb     (13.7-17.5)  gm/dl


 


Hct     (40.1-51.0)  %


 


MCV     (79.0-92.2)  fl


 


MCH     (25.7-32.2)  pg


 


MCHC     (32.2-35.5)  g/dl


 


RDW Std Deviation     (35.1-43.9)  fL


 


Plt Count     (163-337)  K/mm3


 


MPV     (9.4-12.3)  fl


 


Neut % (Auto)     (34.0-67.9)  %


 


Lymph % (Auto)     (21.8-53.1)  %


 


Mono % (Auto)     (5.3-12.2)  %


 


Eos % (Auto)     (0.8-7.0)  


 


Baso % (Auto)     (0.1-1.2)  %


 


Neut # (Auto)     (1.78-5.38)  K/mm3


 


Lymph # (Auto)     (1.32-3.57)  K/mm3


 


Mono # (Auto)     (0.30-0.82)  K/mm3


 


Eos # (Auto)     (0.04-0.54)  K/mm3


 


Baso # (Auto)     (0.01-0.08)  K/mm3


 


Manual Slide Review     


 


PT     (9.7-11.7)  SECONDS


 


INR     


 


APTT     (22-31)  SECONDS


 


D-Dimer, Quantitative     (0.19-0.50)  mg/L


 


Sodium     (136-145)  mEq/L


 


Potassium     (3.5-5.1)  mEq/L


 


Chloride     ()  mEq/L


 


Carbon Dioxide     (21-32)  mEq/L


 


Anion Gap     (5-15)  


 


BUN     (7-18)  mg/dL


 


Creatinine     (0.7-1.3)  mg/dL


 


Est Cr Clr Drug Dosing     mL/min


 


Estimated GFR (MDRD)     (>60)  mL/min


 


BUN/Creatinine Ratio     (14-18)  


 


Glucose     ()  mg/dL


 


Lactic Acid  1.4    (0.4-2.0)  mmol/L


 


Calcium     (8.5-10.1)  mg/dL


 


Ferritin    324  ()  ng/ml


 


Total Bilirubin     (0.2-1.0)  mg/dL


 


AST     (15-37)  U/L


 


ALT     (16-63)  U/L


 


Alkaline Phosphatase     ()  U/L


 


Lactate Dehydrogenase     ()  U/L


 


C-Reactive Protein     (<1.0)  mg/dL


 


Total Protein     (6.4-8.2)  g/dl


 


Albumin     (3.4-5.0)  g/dl


 


Globulin     gm/dL


 


Albumin/Globulin Ratio     (1-2)  


 


SARS-CoV-2 RNA (DANYELL)   Negative   (NEGATIVE)  











Meds: 


Medications











Generic Name Dose Route Start Last Admin





  Trade Name George  PRN Reason Stop Dose Admin


 


Sodium Chloride  100 mls @ 75 mls/hr  10/22/20 14:15  10/22/20 14:36





  Normal Saline  IV   75 mls/hr





  ASDIRECTED LIZ   Administration


 


Sodium Chloride  10 ml  10/22/20 12:29  10/22/20 14:36





  Saline Flush  FLUSH   10 ml





  ASDIRECTED PRN   Administration





  Keep Vein Open  


 


Sodium Chloride  10 ml  10/22/20 14:15  10/22/20 13:00





  Saline Flush  FLUSH   10 ml





  ONETIME PRN   Administration





  IV FLUSH  














Discontinued Medications














Generic Name Dose Route Start Last Admin





  Trade Name George  PRN Reason Stop Dose Admin


 


Albuterol  0 gm  10/22/20 12:30  10/22/20 12:44





  Proventil Hfa  INH  10/22/20 12:31  2 puff





  ONETIME ONE   Administration


 


Ceftriaxone Sodium 2 gm/  100 mls @ 200 mls/hr  10/22/20 14:20  10/22/20 15:02





  Sodium Chloride  IV  10/22/20 14:49  200 mls/hr





  ONETIME ONE   Administration


 


Iopamidol  100 ml  10/22/20 14:15  10/22/20 14:36





  Isovue-370 (76%)  IVPUSH  10/22/20 14:16  100 ml





  ONETIME ONE   Administration














- Re-Assessments/Exams


Free Text/Narrative Re-Assessment/Exam: 





10/22/20 12:55


I ordered oxygen, IV saline lock, albuterol 2 puffs, labs, CXR, lactic acid and 

COVID 19 test.


10/22/20 12:56





10/22/20 14:22


His WBC was elevated at 15.86.  His D-dimer was elevated at 1.79.  I have 

ordered a CT angio of his chest.  His lactic acid is normal.  His alk phos is 

elevated at 162.  His LDH and ferritin is negative.  COVID 19 is negative.  His 

CRP is elevated.  His CXR shows minimal patchy right mid lung atelectasis and or

 minimal pneumonia.  Suspect small right pleural effusion.





His labs support that he does not have COVID 19 and has pneumonia.  I will get a

 CT angio and I have ordered rocephin 2 grams IV.


10/22/20 15:31


His chest CT shows very minimal patchy ground glass as well as some linear 

opacities within the right upper and right middle lobe suggesting minimal 

pneumonia and or atelectasis.  No acute pulmonary embolus.





He feels better.  I will get him on some zithromax.





Departure





- Departure


Time of Disposition: 15:35


Disposition: Home, Self-Care 01


Condition: Good


Clinical Impression: 


Pneumonia


Qualifiers:


 Pneumonia type: due to unspecified organism Laterality: right Lung location: 

unspecified part of lung Qualified Code(s): J18.9 - Pneumonia, unspecified 

organism








- Discharge Information


*PRESCRIPTION DRUG MONITORING PROGRAM REVIEWED*: Not Applicable


*COPY OF PRESCRIPTION DRUG MONITORING REPORT IN PATIENT [place]: Not Applicable


Prescriptions: 


Azithromycin [Zithromax] 250 mg PO DAILY #6 tab


Referrals: 


Willie Vincent Jr, MD [Primary Care Provider] - 1 Week


Forms:  ED Department Discharge


Additional Instructions: 


Drink plenty of fluids.  Take the zithromax 2 pills on day 1 and then 1 pill on 

days 2 through 5.  Take motrin or tylenol as needed for fever or pain.  Use your

inhaler 2 puffs every 6 hours as needed for shortness of breath.  Please return 

if you are worse.





Sepsis Event Note (ED)





- Evaluation


Sepsis Screening Result: No Definite Risk





- Focused Exam


Vital Signs: 


                                   Vital Signs











  Temp Pulse Resp BP Pulse Ox


 


 10/22/20 11:58  98.0 F  98  29 H  160/94 H  92 L














- My Orders


Last 24 Hours: 


My Active Orders





10/22/20 12:29


Cardiac Monitoring [RC] .AS DIRECTED 


Oxygen Therapy [RC] PRN 


Sodium Chloride 0.9% [Saline Flush]   10 ml FLUSH ASDIRECTED PRN 


Peripheral IV Insertion Adult [OM.PC] Stat 





10/22/20 12:30


Peripheral IV Care [RC] .AS DIRECTED 


Chest 1V Frontal [CR] Stat 





10/22/20 12:31


RT Post Treatment Assessment [RC] Click to Edit 


RT Pre-Treatment Assessment [RC] Click to Edit 





10/22/20 14:12


Ang Chest [CT] Stat 





10/22/20 14:15


Sodium Chloride 0.9% [Normal Saline] 100 ml IV ASDIRECTED 


Sodium Chloride 0.9% [Saline Flush]   10 ml FLUSH ONETIME PRN 














- Assessment/Plan


Last 24 Hours: 


My Active Orders





10/22/20 12:29


Cardiac Monitoring [RC] .AS DIRECTED 


Oxygen Therapy [RC] PRN 


Sodium Chloride 0.9% [Saline Flush]   10 ml FLUSH ASDIRECTED PRN 


Peripheral IV Insertion Adult [OM.PC] Stat 





10/22/20 12:30


Peripheral IV Care [RC] .AS DIRECTED 


Chest 1V Frontal [CR] Stat 





10/22/20 12:31


RT Post Treatment Assessment [RC] Click to Edit 


RT Pre-Treatment Assessment [RC] Click to Edit 





10/22/20 14:12


Ang Chest [CT] Stat 





10/22/20 14:15


Sodium Chloride 0.9% [Normal Saline] 100 ml IV ASDIRECTED 


Sodium Chloride 0.9% [Saline Flush]   10 ml FLUSH ONETIME PRN

## 2020-10-23 NOTE — PCM.PN
- General Info


Date of Service: 10/23/20


Admission Dx/Problem (Free Text): 


                           Admission Diagnosis/Problem





Admission Diagnosis/Problem      Pneumonia








Subjective Update: 





The patient is a 77-year-old gentleman who was admitted yesterday.  He is Covid 

negative.  He is being followed for pneumonia.  Patient today is doing better.  

He is still coughing.  The patient has denied any pain.  The patient has been 

tolerating his diet.


Functional Status: Reports: Pain Controlled, Tolerating Diet





- Review of Systems


General: Reports: Weakness


HEENT: Reports: No Symptoms


Pulmonary: Reports: Shortness of Breath, Cough


Cardiovascular: Reports: No Symptoms


Gastrointestinal: Reports: No Symptoms


Genitourinary: Reports: No Symptoms


Musculoskeletal: Reports: No Symptoms


Skin: Reports: No Symptoms


Neurological: Reports: No Symptoms


Psychiatric: Reports: No Symptoms





- Patient Data


Vitals - Most Recent: 


                                Last Vital Signs











Temp  36.6 C   10/23/20 07:23


 


Pulse  85   10/23/20 07:23


 


Resp  20   10/23/20 07:23


 


BP  136/77   10/23/20 07:23


 


Pulse Ox  94 L  10/23/20 07:23











Weight - Most Recent: 88.314 kg


I&O - Last 24 Hours: 


                                 Intake & Output











 10/22/20 10/23/20 10/23/20





 22:59 06:59 14:59


 


Intake Total  1552 


 


Output Total  250 


 


Balance  1302 











Lab Results Last 24 Hours: 


                         Laboratory Results - last 24 hr











  10/22/20 10/22/20 10/22/20 Range/Units





  13:00 13:00 13:00 


 


WBC  15.86 H    (4.23-9.07)  K/mm3


 


RBC  5.15    (4.63-6.08)  M/mm3


 


Hgb  15.2    (13.7-17.5)  gm/dl


 


Hct  48.6    (40.1-51.0)  %


 


MCV  94.4 H    (79.0-92.2)  fl


 


MCH  29.5    (25.7-32.2)  pg


 


MCHC  31.3 L    (32.2-35.5)  g/dl


 


RDW Std Deviation  46.1 H    (35.1-43.9)  fL


 


Plt Count  279    (163-337)  K/mm3


 


MPV  10.0    (9.4-12.3)  fl


 


Neut % (Auto)  86.0 H    (34.0-67.9)  %


 


Lymph % (Auto)  5.5 L    (21.8-53.1)  %


 


Mono % (Auto)  8.1    (5.3-12.2)  %


 


Eos % (Auto)  0 L    (0.8-7.0)  


 


Baso % (Auto)  0.2    (0.1-1.2)  %


 


Neut # (Auto)  13.64 H    (1.78-5.38)  K/mm3


 


Lymph # (Auto)  0.88 L    (1.32-3.57)  K/mm3


 


Mono # (Auto)  1.28 H    (0.30-0.82)  K/mm3


 


Eos # (Auto)  0.00 L    (0.04-0.54)  K/mm3


 


Baso # (Auto)  0.03    (0.01-0.08)  K/mm3


 


Manual Slide Review  Abnormal smear    


 


PT   10.9   (9.7-11.7)  SECONDS


 


INR   1.02   


 


APTT   26   (22-31)  SECONDS


 


D-Dimer, Quantitative   1.79 H   (0.19-0.50)  mg/L


 


Sodium    142  (136-145)  mEq/L


 


Potassium    3.8  (3.5-5.1)  mEq/L


 


Chloride    105  ()  mEq/L


 


Carbon Dioxide    25  (21-32)  mEq/L


 


Anion Gap    15.8 H  (5-15)  


 


BUN    11  (7-18)  mg/dL


 


Creatinine    0.8  (0.7-1.3)  mg/dL


 


Est Cr Clr Drug Dosing    79.84  mL/min


 


Estimated GFR (MDRD)    > 60  (>60)  mL/min


 


BUN/Creatinine Ratio    13.8 L  (14-18)  


 


Glucose    120 H  ()  mg/dL


 


Lactic Acid     (0.4-2.0)  mmol/L


 


Calcium    9.3  (8.5-10.1)  mg/dL


 


Phosphorus     (2.6-4.7)  mg/dL


 


Magnesium     (1.8-2.4)  mg/dl


 


Ferritin     ()  ng/ml


 


Total Bilirubin    0.7  (0.2-1.0)  mg/dL


 


AST    19  (15-37)  U/L


 


ALT    40  (16-63)  U/L


 


Alkaline Phosphatase    162 H  ()  U/L


 


Lactate Dehydrogenase    144  ()  U/L


 


C-Reactive Protein    5.8 H*  (<1.0)  mg/dL


 


Total Protein    7.6  (6.4-8.2)  g/dl


 


Albumin    3.6  (3.4-5.0)  g/dl


 


Globulin    4.0  gm/dL


 


Albumin/Globulin Ratio    0.9 L  (1-2)  


 


SARS-CoV-2 RNA (DANYELL)     (NEGATIVE)  














  10/22/20 10/22/20 10/22/20 Range/Units





  13:00 13:00 13:00 


 


WBC     (4.23-9.07)  K/mm3


 


RBC     (4.63-6.08)  M/mm3


 


Hgb     (13.7-17.5)  gm/dl


 


Hct     (40.1-51.0)  %


 


MCV     (79.0-92.2)  fl


 


MCH     (25.7-32.2)  pg


 


MCHC     (32.2-35.5)  g/dl


 


RDW Std Deviation     (35.1-43.9)  fL


 


Plt Count     (163-337)  K/mm3


 


MPV     (9.4-12.3)  fl


 


Neut % (Auto)     (34.0-67.9)  %


 


Lymph % (Auto)     (21.8-53.1)  %


 


Mono % (Auto)     (5.3-12.2)  %


 


Eos % (Auto)     (0.8-7.0)  


 


Baso % (Auto)     (0.1-1.2)  %


 


Neut # (Auto)     (1.78-5.38)  K/mm3


 


Lymph # (Auto)     (1.32-3.57)  K/mm3


 


Mono # (Auto)     (0.30-0.82)  K/mm3


 


Eos # (Auto)     (0.04-0.54)  K/mm3


 


Baso # (Auto)     (0.01-0.08)  K/mm3


 


Manual Slide Review     


 


PT     (9.7-11.7)  SECONDS


 


INR     


 


APTT     (22-31)  SECONDS


 


D-Dimer, Quantitative     (0.19-0.50)  mg/L


 


Sodium     (136-145)  mEq/L


 


Potassium     (3.5-5.1)  mEq/L


 


Chloride     ()  mEq/L


 


Carbon Dioxide     (21-32)  mEq/L


 


Anion Gap     (5-15)  


 


BUN     (7-18)  mg/dL


 


Creatinine     (0.7-1.3)  mg/dL


 


Est Cr Clr Drug Dosing     mL/min


 


Estimated GFR (MDRD)     (>60)  mL/min


 


BUN/Creatinine Ratio     (14-18)  


 


Glucose     ()  mg/dL


 


Lactic Acid  1.4    (0.4-2.0)  mmol/L


 


Calcium     (8.5-10.1)  mg/dL


 


Phosphorus     (2.6-4.7)  mg/dL


 


Magnesium     (1.8-2.4)  mg/dl


 


Ferritin    324  ()  ng/ml


 


Total Bilirubin     (0.2-1.0)  mg/dL


 


AST     (15-37)  U/L


 


ALT     (16-63)  U/L


 


Alkaline Phosphatase     ()  U/L


 


Lactate Dehydrogenase     ()  U/L


 


C-Reactive Protein     (<1.0)  mg/dL


 


Total Protein     (6.4-8.2)  g/dl


 


Albumin     (3.4-5.0)  g/dl


 


Globulin     gm/dL


 


Albumin/Globulin Ratio     (1-2)  


 


SARS-CoV-2 RNA (DANYELL)   Negative   (NEGATIVE)  














  10/23/20 10/23/20 Range/Units





  05:32 05:32 


 


WBC  9.29 H   (4.23-9.07)  K/mm3


 


RBC  4.78   (4.63-6.08)  M/mm3


 


Hgb  14.0   (13.7-17.5)  gm/dl


 


Hct  45.6   (40.1-51.0)  %


 


MCV  95.4 H   (79.0-92.2)  fl


 


MCH  29.3   (25.7-32.2)  pg


 


MCHC  30.7 L   (32.2-35.5)  g/dl


 


RDW Std Deviation  46.2 H   (35.1-43.9)  fL


 


Plt Count  269   (163-337)  K/mm3


 


MPV  10.5   (9.4-12.3)  fl


 


Neut % (Auto)  91.5 H   (34.0-67.9)  %


 


Lymph % (Auto)  7.9 L   (21.8-53.1)  %


 


Mono % (Auto)  0.5 L   (5.3-12.2)  %


 


Eos % (Auto)  0 L   (0.8-7.0)  


 


Baso % (Auto)  0.0 L   (0.1-1.2)  %


 


Neut # (Auto)  8.50 H   (1.78-5.38)  K/mm3


 


Lymph # (Auto)  0.73 L   (1.32-3.57)  K/mm3


 


Mono # (Auto)  0.05 L   (0.30-0.82)  K/mm3


 


Eos # (Auto)  0.00 L   (0.04-0.54)  K/mm3


 


Baso # (Auto)  0.00 L   (0.01-0.08)  K/mm3


 


Manual Slide Review  Abnormal smear   


 


PT    (9.7-11.7)  SECONDS


 


INR    


 


APTT    (22-31)  SECONDS


 


D-Dimer, Quantitative    (0.19-0.50)  mg/L


 


Sodium   141  (136-145)  mEq/L


 


Potassium   4.2  (3.5-5.1)  mEq/L


 


Chloride   106  ()  mEq/L


 


Carbon Dioxide   23  (21-32)  mEq/L


 


Anion Gap   16.2 H  (5-15)  


 


BUN   13  (7-18)  mg/dL


 


Creatinine   0.7  (0.7-1.3)  mg/dL


 


Est Cr Clr Drug Dosing   91.25  mL/min


 


Estimated GFR (MDRD)   > 60  (>60)  mL/min


 


BUN/Creatinine Ratio   18.6 H  (14-18)  


 


Glucose   161 H  ()  mg/dL


 


Lactic Acid    (0.4-2.0)  mmol/L


 


Calcium   9.1  (8.5-10.1)  mg/dL


 


Phosphorus   2.5 L  (2.6-4.7)  mg/dL


 


Magnesium   2.2  (1.8-2.4)  mg/dl


 


Ferritin    ()  ng/ml


 


Total Bilirubin   0.4  (0.2-1.0)  mg/dL


 


AST   14 L  (15-37)  U/L


 


ALT   31  (16-63)  U/L


 


Alkaline Phosphatase   140 H  ()  U/L


 


Lactate Dehydrogenase    ()  U/L


 


C-Reactive Protein    (<1.0)  mg/dL


 


Total Protein   7.2  (6.4-8.2)  g/dl


 


Albumin   3.2 L  (3.4-5.0)  g/dl


 


Globulin   4.0  gm/dL


 


Albumin/Globulin Ratio   0.8 L  (1-2)  


 


SARS-CoV-2 RNA (DANYELL)    (NEGATIVE)  











Seb Results Last 24 Hours: 


                                  Microbiology











 10/22/20 20:56 Gram Stain - Final





 Sputum - Expectorated Sputum Culture - Final











Med Orders - Current: 


                               Current Medications





Acetaminophen (Tylenol)  650 mg PO Q4H PRN


   PRN Reason: Pain (Mild 1-3)/fever


Albuterol/Ipratropium (Duoneb 3.0-0.5 Mg/3 Ml)  3 ml NEB Q4H PRN


   PRN Reason: Shortness Of Breath/wheezing


Alprazolam (Xanax)  0.25 mg PO TID [facility]


Amlodipine Besylate (Norvasc)  5 mg PO DAILY [facility]


Docusate Sodium (Colace)  100 mg PO BID PRN


   PRN Reason: Constipation


   Last Admin: 10/22/20 19:04 Dose:  100 mg


   Documented by: 


Enoxaparin Sodium (Lovenox)  40 mg SUBCUT DAILY [facility]


   Last Admin: 10/22/20 18:53 Dose:  40 mg


   Documented by: 


Hydroxyzine HCl (Atarax)  25 mg PO BID PRN


   PRN Reason: Itching


Sodium Chloride (Normal Saline)  1,000 mls @ 75 mls/hr IV ASDIRECTED [facility]


   Last Admin: 10/22/20 18:52 Dose:  75 mls/hr


   Documented by: 


Azithromycin 500 mg/ Sodium (Chloride)  250 mls @ 250 mls/hr IV Q24H [facility]


   Last Admin: 10/22/20 19:01 Dose:  250 mls/hr


   Documented by: 


Methylprednisolone Sodium Succinate (Solu-Medrol)  125 mg IVPUSH Q8H [facility]


   Last Admin: 10/23/20 01:59 Dose:  125 mg


   Documented by: 


Miscellaneous Information (Remove Patch)  0 ea TRDERM DAILY [facility]


Nicotine (Habitrol)  14 mg TRDERM DAILY [facility]


Non-Formulary Medication (Cholecalciferol (Vitamin D3))  5,000 unit PO DAILY [facility]


Non-Formulary Medication (Dapsone [Dapsone])  1 tab PO BID [facility]


Non-Formulary Medication (Fexofenadine)  180 mg PO DAILY [facility]


Non-Formulary Medication (Vitamin A [Vitamin A])  2,400 mg PO DAILY [facility]


Non-Formulary Medication (Vitamin E Acetate [Vitamin E])  400 unit PO DAILY [facility]


Oxycodone HCl (Oxycodone)  5 mg PO Q4H PRN


   PRN Reason: Pain (moderate 4-6)


Sodium Chloride (Saline Flush)  10 ml FLUSH ASDIRECTED PRN


   PRN Reason: Keep Vein Open


   Last Admin: 10/22/20 14:36 Dose:  10 ml


   Documented by: 


Sodium Chloride (Saline Flush)  10 ml FLUSH ONETIME PRN


   PRN Reason: IV FLUSH


   Last Admin: 10/22/20 13:00 Dose:  10 ml


   Documented by: 


Temazepam (Restoril)  15 mg PO BEDTIME PRN


   PRN Reason: Sleep


Triamcinolone Acetonide (Triamcinolone Acetonide 0.1% Crm)   gm TOP BID [facility]





Discontinued Medications





Albuterol (Proventil Hfa)  0 gm INH ONETIME ONE


   Stop: 10/22/20 12:31


   Last Admin: 10/22/20 12:44 Dose:  2 puff


   Documented by: 


Azithromycin (Zithromax)  500 mg IV Q24H [facility]


   Last Admin: 10/22/20 18:40 Dose:  Not Given


   Documented by: 


Sodium Chloride (Normal Saline)  100 mls @ 75 mls/hr IV ASDIRECTED [facility]


   Last Admin: 10/22/20 14:36 Dose:  75 mls/hr


   Documented by: 


Ceftriaxone Sodium 2 gm/ (Sodium Chloride)  100 mls @ 200 mls/hr IV ONETIME ONE


   Stop: 10/22/20 14:49


   Last Admin: 10/22/20 15:02 Dose:  200 mls/hr


   Documented by: 


Iopamidol (Isovue-370 (76%))  100 ml IVPUSH ONETIME ONE


   Stop: 10/22/20 14:16


   Last Admin: 10/22/20 14:36 Dose:  100 ml


   Documented by: 











- Exam


Quality Assessment: Supplemental Oxygen, DVT Prophylaxis


General: Alert, Oriented, Cooperative


HEENT: Pupils Equal, Pupils Reactive, EOMI


Neck: Supple, Trachea Midline


Lungs: Normal Respiratory Effort, Rales


Cardiovascular: Regular Rate, Regular Rhythm


GI/Abdominal Exam: Normal Bowel Sounds, Soft, Non-Tender, No Distention


 (Male) Exam: Deferred


Back Exam: Normal Inspection, Full Range of Motion


Extremities: Normal Inspection, No Pedal Edema


Skin: Warm, Dry, Intact


Neurological: No New Focal Deficit


Psy/Mental Status: Alert, Normal Affect, Normal Mood





Sepsis Event Note





- Evaluation


Sepsis Screening Result: No Definite Risk





- Focused Exam


Vital Signs: 


                                   Vital Signs











  Temp Pulse Resp BP Pulse Ox Pulse Ox


 


 10/23/20 07:23  36.6 C  85  20  136/77  94 L 


 


 10/23/20 07:01  36.5 C  81  18  133/80  95 


 


 10/23/20 06:10       93 L


 


 10/22/20 23:09  36.9 C  83  18  120/63  94 L 














- Problem List & Annotations


(1) Acute and chronic respiratory failure


SNOMED Code(s): 03806719


   Code(s): J96.20 - ACUTE AND CHR RESP FAILURE, UNSP W HYPOXIA OR HYPERCAPNIA  

Status: Chronic   Priority: High   Current Visit: Yes   


Qualifiers: 


   Respiratory failure complication: hypoxia   Qualified Code(s): J96.21 - Acute

and chronic respiratory failure with hypoxia   





(2) Chronic bronchitis with acute exacerbation


SNOMED Code(s): 565982688


   Code(s): J20.9 - ACUTE BRONCHITIS, UNSPECIFIED; J42 - UNSPECIFIED CHRONIC 

BRONCHITIS   Status: Acute   Priority: High   Current Visit: Yes   





(3) Pneumonia


SNOMED Code(s): 441937751


   Code(s): J18.9 - PNEUMONIA, UNSPECIFIED ORGANISM   Status: Acute   Priority: 

High   Current Visit: Yes   


Qualifiers: 


   Pneumonia type: due to unspecified organism   Laterality: right   Lung 

location: unspecified part of lung   Qualified Code(s): J18.9 - Pneumonia, 

unspecified organism   





(4) Smokeless tobacco use


SNOMED Code(s): 444286344


   Code(s): Z72.0 - TOBACCO USE   Status: Chronic   Priority: Medium   Current 

Visit: Yes   





- Problem List Review


Problem List Initiated/Reviewed/Updated: Yes





- My Orders


Last 24 Hours: 


My Active Orders





10/22/20 16:54


Patient Status [ADT] Routine 


Oxygen Therapy [RC] PRN 


Up With Assistance [RC] ASDIRECTED 


VTE/DVT Education [RC] PER UNIT ROUTINE 


Vital Signs [RC] Q4HR 


OT Evaluation and Treatment [CONS] Routine 


PT Evaluation and Treatment [CONS] Routine 


Acetaminophen [TylenoL]   650 mg PO Q4H PRN 


Albuterol/Ipratropium [DuoNeb 3.0-0.5 MG/3 ML]   3 ml NEB Q4H PRN 


Docusate Sodium [Colace]   100 mg PO BID PRN 


oxyCODONE   5 mg PO Q4H PRN 


Resuscitation Status Routine 





10/22/20 16:57


Cardiac Monitoring [RC] INTERMITTENT 





10/22/20 16:59


RT Aerosol Therapy [RC] ASDIRECTED 





10/22/20 Dinner


Regular Diet [DIET] 


Enoxaparin [Lovenox]   40 mg SUBCUT DAILY 


Sodium Chloride 0.9% [Normal Saline] 1,000 ml IV ASDIRECTED 





10/22/20 18:00


Azithromycin [Zithromax] 500 mg   Sodium Chloride 0.9% [Normal Saline (AdvBag)] 

250 ml IV Q24H 


methylPREDNISolone Sod Succ [Solu-MEDROL]   125 mg IVPUSH Q8H 





10/22/20 21:00


Temazepam [Restoril]   15 mg PO BEDTIME PRN 





10/23/20 08:05


hydrOXYzine HCL [Atarax]   25 mg PO BID PRN 





10/23/20 09:00


ALPRAZolam [Xanax]   0.25 mg PO TID 


Cholecalciferol (Vitamin D3)   5,000 unit PO DAILY 


Dapsone [Dapsone]   1 tab PO BID 


Fexofenadine   180 mg PO DAILY 


Nicotine [Habitrol]   14 mg TRDERM DAILY 


Triamcinolone Acetonide [Triamcinolone Acetonide 0.1% Crm]   DOSE gm TOP BID 


Vitamin A [Vitamin A]   2,400 mg PO DAILY 


Vitamin E Acetate [Vitamin E]   400 unit PO DAILY 


amLODIPine [Norvasc]   5 mg PO DAILY 





10/24/20 09:00


Remove Patch   0 ea TRDERM DAILY 














- Plan


Plan:: 





The patient is a 77-year-old gentleman who will be admitted as an inpatient due 

to his pneumonia as well as acute on chronic respiratory failure.  The patient 

will have oxygen support to help keep his saturations above 92%.  I have also 

ordered albuterol Atrovent nebulizers every 2 hours when wheezing.  The patient 

also be kept on IV fluids at 75 mL/h.  Patient also has been placed on Solu-

Medrol  mg every 8 hours.  The patient will also have a azithromycin 500 

mg IV on a daily basis.  I have also strongly counseled the patient with regards

 to smokeless tobacco usage.  He has been offered nicotine patch as needed.  

Patient has refused this.  PT OT is also been ordered for the patient.  Repeat 

laboratory studies have been ordered for the morning.  Patient has been 

encouraged to ambulate.  He will have a regular diet as tolerated.  Patient 

should be appropriate for discharge in 2 to 3 days.  The patient is COVID-19 

negative.





10/23/2020





The patient was admitted secondary to pneumonia as well as acute on chronic 

respiratory failure.  The patient will continue with his oxygen support to keep 

his saturations around 92%.  Also the patient should continue to try to produce 

a sputum sample for culture.  Patient will continue on his antibiotics for now. 

 Have encouraged him to ambulate.  The patient will also be kept on the Solu-

Medrol  mg every 8 hours.  The patient is to continue with his diet as 

tolerated.  The patient should be appropriate for discharge in 1 to 2 days.

## 2020-10-24 NOTE — PCM.PN
- General Info


Date of Service: 10/24/20


Admission Dx/Problem (Free Text): 


                           Admission Diagnosis/Problem





Admission Diagnosis/Problem      Pneumonia








Subjective Update: 





The patient is a 77-year-old gentleman who was admitted to acute hospitalization

on October 22, 2020.  This was for pneumonia.  Patient says that he is doing 

better today.  He is still somewhat short of breath.  The patient also has a 

cough.  The patient has been tolerating his diet.


Functional Status: Reports: Pain Controlled, Tolerating Diet





- Review of Systems


General: Reports: Weakness


HEENT: Reports: No Symptoms


Pulmonary: Reports: Shortness of Breath, Cough


Cardiovascular: Reports: No Symptoms


Gastrointestinal: Reports: No Symptoms


Genitourinary: Reports: No Symptoms


Musculoskeletal: Reports: No Symptoms


Skin: Reports: No Symptoms


Neurological: Reports: No Symptoms


Psychiatric: Reports: No Symptoms





- Patient Data


Vitals - Most Recent: 


                                Last Vital Signs











Temp  36.8 C   10/24/20 03:35


 


Pulse  84   10/24/20 03:35


 


Resp  14   10/24/20 03:35


 


BP  137/81   10/24/20 09:42


 


Pulse Ox  93 L  10/24/20 08:37











Weight - Most Recent: 89.766 kg


I&O - Last 24 Hours: 


                                 Intake & Output











 10/23/20 10/24/20 10/24/20





 22:59 06:59 14:59


 


Intake Total 2000 1723 


 


Output Total 200 875 


 


Balance 1800 848 











Lab Results Last 24 Hours: 


                         Laboratory Results - last 24 hr











  10/24/20 10/24/20 Range/Units





  06:25 06:25 


 


WBC  15.28 H   (4.23-9.07)  K/mm3


 


RBC  4.32 L   (4.63-6.08)  M/mm3


 


Hgb  12.7 L   (13.7-17.5)  gm/dl


 


Hct  41.1   (40.1-51.0)  %


 


MCV  95.1 H   (79.0-92.2)  fl


 


MCH  29.4   (25.7-32.2)  pg


 


MCHC  30.9 L   (32.2-35.5)  g/dl


 


RDW Std Deviation  46.0 H   (35.1-43.9)  fL


 


Plt Count  282   (163-337)  K/mm3


 


MPV  10.1   (9.4-12.3)  fl


 


Neut % (Auto)  89.0 H   (34.0-67.9)  %


 


Lymph % (Auto)  6.2 L   (21.8-53.1)  %


 


Mono % (Auto)  4.6 L   (5.3-12.2)  %


 


Eos % (Auto)  0 L   (0.8-7.0)  


 


Baso % (Auto)  0.0 L   (0.1-1.2)  %


 


Neut # (Auto)  13.59 H   (1.78-5.38)  K/mm3


 


Lymph # (Auto)  0.95 L   (1.32-3.57)  K/mm3


 


Mono # (Auto)  0.71   (0.30-0.82)  K/mm3


 


Eos # (Auto)  0.00 L   (0.04-0.54)  K/mm3


 


Baso # (Auto)  0.00 L   (0.01-0.08)  K/mm3


 


Manual Slide Review  Abnormal smear   


 


Sodium   142  (136-145)  mEq/L


 


Potassium   4.1  (3.5-5.1)  mEq/L


 


Chloride   108 H  ()  mEq/L


 


Carbon Dioxide   25  (21-32)  mEq/L


 


Anion Gap   13.1  (5-15)  


 


BUN   19 H  (7-18)  mg/dL


 


Creatinine   0.7  (0.7-1.3)  mg/dL


 


Est Cr Clr Drug Dosing   91.25  mL/min


 


Estimated GFR (MDRD)   > 60  (>60)  mL/min


 


BUN/Creatinine Ratio   27.1 H  (14-18)  


 


Glucose   163 H  ()  mg/dL


 


Calcium   8.8  (8.5-10.1)  mg/dL


 


Total Bilirubin   0.5  (0.2-1.0)  mg/dL


 


AST   23  (15-37)  U/L


 


ALT   32  (16-63)  U/L


 


Alkaline Phosphatase   121 H  ()  U/L


 


Total Protein   6.4  (6.4-8.2)  g/dl


 


Albumin   2.9 L  (3.4-5.0)  g/dl


 


Globulin   3.5  gm/dL


 


Albumin/Globulin Ratio   0.8 L  (1-2)  











Seb Results Last 24 Hours: 


                                  Microbiology











 10/22/20 20:56 Gram Stain - Final





 Sputum - Expectorated Sputum Culture - Final











Med Orders - Current: 


                               Current Medications





Acetaminophen (Tylenol)  650 mg PO Q4H PRN


   PRN Reason: Pain (Mild 1-3)/fever


Albuterol (Proventil Hfa)  0 gm INH Q4H PRN


   PRN Reason: Wheezing


   Last Admin: 10/24/20 08:37 Dose:  2 puff


   Documented by: 


Albuterol/Ipratropium (Duoneb 3.0-0.5 Mg/3 Ml)  3 ml NEB Q4H PRN


   PRN Reason: Shortness Of Breath/wheezing


Alprazolam (Xanax)  0.25 mg PO TID [facility]


   Last Admin: 10/24/20 09:41 Dose:  0.25 mg


   Documented by: 


Amlodipine Besylate (Norvasc)  5 mg PO DAILY [facility]


   Last Admin: 10/24/20 09:42 Dose:  5 mg


   Documented by: 


Cholecalciferol (Vitamin D3)  5,000 unit PO DAILY [facility]


   Last Admin: 10/24/20 09:42 Dose:  5,000 unit


   Documented by: 


Docusate Sodium (Colace)  100 mg PO BID PRN


   PRN Reason: Constipation


   Last Admin: 10/22/20 19:04 Dose:  100 mg


   Documented by: 


Enoxaparin Sodium (Lovenox)  40 mg SUBCUT DAILY [facility]


   Last Admin: 10/24/20 09:42 Dose:  40 mg


   Documented by: 


Hydroxyzine HCl (Atarax)  25 mg PO BID PRN


   PRN Reason: Itching


Sodium Chloride (Normal Saline)  1,000 mls @ 75 mls/hr IV ASDIRECTED [facility]


   Last Admin: 10/23/20 22:11 Dose:  75 mls/hr


   Documented by: 


Azithromycin 500 mg/ Sodium (Chloride)  250 mls @ 250 mls/hr IV Q24H [facility]


   Last Admin: 10/23/20 17:36 Dose:  250 mls/hr


   Documented by: 


Loratadine (Claritin)  10 mg PO DAILY [facility]


   Last Admin: 10/24/20 09:42 Dose:  10 mg


   Documented by: 


Methylprednisolone Sodium Succinate (Solu-Medrol)  125 mg IVPUSH Q8H [facility]


   Last Admin: 10/24/20 09:43 Dose:  125 mg


   Documented by: 


Miscellaneous Information (Remove Patch)  0 ea TRDERM DAILY [facility]


   Last Admin: 10/24/20 09:43 Dose:  Not Given


   Documented by: 


Nicotine (Habitrol)  14 mg TRDERM DAILY [facility]


   Last Admin: 10/24/20 09:43 Dose:  Not Given


   Documented by: 


Dapsone [Dapsone] Pt ('s Own Med)  1 tab PO BID [facility]


   Last Admin: 10/24/20 09:42 Dose:  1 tab


   Documented by: 


Fluticasone Hfa [ Flovent] 220mcg Inh ** Own Med **  0 each INH BID [facility]


   Last Admin: 10/24/20 08:36 Dose:  1 each


   Documented by: 


Oxycodone HCl (Oxycodone)  5 mg PO Q4H PRN


   PRN Reason: Pain (moderate 4-6)


Sodium Chloride (Saline Flush)  10 ml FLUSH ASDIRECTED PRN


   PRN Reason: Keep Vein Open


   Last Admin: 10/22/20 14:36 Dose:  10 ml


   Documented by: 


Sodium Chloride (Saline Flush)  10 ml FLUSH ONETIME PRN


   PRN Reason: IV FLUSH


   Last Admin: 10/22/20 13:00 Dose:  10 ml


   Documented by: 


Temazepam (Restoril)  15 mg PO BEDTIME PRN


   PRN Reason: Sleep


Triamcinolone Acetonide (Triamcinolone Acetonide 0.1% Crm)  0 gm TOP BID [facility]


   Last Admin: 10/24/20 10:08 Dose:  1 applic


   Documented by: 





Discontinued Medications





Albuterol (Proventil Hfa)  0 gm INH ONETIME ONE


   Stop: 10/22/20 12:31


   Last Admin: 10/22/20 12:44 Dose:  2 puff


   Documented by: 


Azithromycin (Zithromax)  500 mg IV Q24H [facility]


   Last Admin: 10/22/20 18:40 Dose:  Not Given


   Documented by: 


Sodium Chloride (Normal Saline)  100 mls @ 75 mls/hr IV ASDIRECTED [facility]


   Last Admin: 10/22/20 14:36 Dose:  75 mls/hr


   Documented by: 


Ceftriaxone Sodium 2 gm/ (Sodium Chloride)  100 mls @ 200 mls/hr IV ONETIME ONE


   Stop: 10/22/20 14:49


   Last Admin: 10/22/20 15:02 Dose:  200 mls/hr


   Documented by: 


Iopamidol (Isovue-370 (76%))  100 ml IVPUSH ONETIME ONE


   Stop: 10/22/20 14:16


   Last Admin: 10/22/20 14:36 Dose:  100 ml


   Documented by: 


Non-Formulary Medication (Albuterol)  2 puff INH Q4H [facility]


   Last Admin: 10/23/20 18:50 Dose:  Not Given


   Documented by: 











- Exam


Quality Assessment: Supplemental Oxygen, DVT Prophylaxis


General: Alert, Oriented, Cooperative, No Acute Distress


HEENT: Pupils Equal, Pupils Reactive, EOMI, Mucous Membr. Moist/Pink


Neck: Supple, Trachea Midline


Lungs: Normal Respiratory Effort, Rales


Cardiovascular: Regular Rate, Regular Rhythm


GI/Abdominal Exam: Normal Bowel Sounds, Soft, Non-Tender, No Distention


 (Male) Exam: Deferred


Back Exam: Normal Inspection, Full Range of Motion


Extremities: Normal Inspection, No Pedal Edema


Skin: Warm, Dry, Intact


Neurological: No New Focal Deficit


Psy/Mental Status: Alert, Normal Affect, Normal Mood





Sepsis Event Note





- Evaluation


Sepsis Screening Result: No Definite Risk





- Focused Exam


Vital Signs: 


                                   Vital Signs











  Temp Pulse Resp BP Pulse Ox Pulse Ox


 


 10/24/20 09:42     137/81  


 


 10/24/20 08:37       93 L


 


 10/24/20 03:35  36.8 C  84  14  122/80  94 L 














- Problem List & Annotations


(1) Acute and chronic respiratory failure


SNOMED Code(s): 56522028


   Code(s): J96.20 - ACUTE AND CHR RESP FAILURE, UNSP W HYPOXIA OR HYPERCAPNIA  

Status: Chronic   Priority: High   Current Visit: Yes   


Qualifiers: 


   Respiratory failure complication: hypoxia   Qualified Code(s): J96.21 - Acute

and chronic respiratory failure with hypoxia   





(2) Chronic bronchitis with acute exacerbation


SNOMED Code(s): 914937278


   Code(s): J20.9 - ACUTE BRONCHITIS, UNSPECIFIED; J42 - UNSPECIFIED CHRONIC 

BRONCHITIS   Status: Acute   Priority: High   Current Visit: Yes   





(3) Pneumonia


SNOMED Code(s): 676574295


   Code(s): J18.9 - PNEUMONIA, UNSPECIFIED ORGANISM   Status: Acute   Priority: 

High   Current Visit: Yes   


Qualifiers: 


   Pneumonia type: due to unspecified organism   Laterality: right   Lung 

location: unspecified part of lung   Qualified Code(s): J18.9 - Pneumonia, 

unspecified organism   





(4) Smokeless tobacco use


SNOMED Code(s): 302600577


   Code(s): Z72.0 - TOBACCO USE   Status: Chronic   Priority: Medium   Current 

Visit: Yes   





- Problem List Review


Problem List Initiated/Reviewed/Updated: Yes





- My Orders


Last 24 Hours: 


My Active Orders





10/23/20 18:51


Albuterol [Proventil HFA]   See Dose Instructions  INH Q4H PRN 





10/23/20 22:04


Patient Status [ADT] Routine 





10/24/20 09:00


Non-Formulary Medication [NF Drug]   0 each INH BID 


Remove Patch   0 ea TRDERM DAILY 














- Plan


Plan:: 





The patient is a 77-year-old gentleman who will be admitted as an inpatient due 

to his pneumonia as well as acute on chronic respiratory failure.  The patient 

will have oxygen support to help keep his saturations above 92%.  I have also 

ordered albuterol Atrovent nebulizers every 2 hours when wheezing.  The patient 

also be kept on IV fluids at 75 mL/h.  Patient also has been placed on Solu-

Medrol  mg every 8 hours.  The patient will also have a azithromycin 500 

mg IV on a daily basis.  I have also strongly counseled the patient with regards

to smokeless tobacco usage.  He has been offered nicotine patch as needed.  Lou

ent has refused this.  PT OT is also been ordered for the patient.  Repeat 

laboratory studies have been ordered for the morning.  Patient has been 

encouraged to ambulate.  He will have a regular diet as tolerated.  Patient 

should be appropriate for discharge in 2 to 3 days.  The patient is COVID-19 

negative.





10/23/2020





The patient was admitted secondary to pneumonia as well as acute on chronic 

respiratory failure.  The patient will continue with his oxygen support to keep 

his saturations around 92%.  Also the patient should continue to try to produce 

a sputum sample for culture.  Patient will continue on his antibiotics for now. 

Have encouraged him to ambulate.  The patient will also be kept on the Solu-

Medrol  mg every 8 hours.  The patient is to continue with his diet as 

tolerated.  The patient should be appropriate for discharge in 1 to 2 days.





10/24/2020 





The patient has been admitted secondary to pneumonia.  He is improved somewhat 

today.  We will keep him on oxygen to help maintain his saturations around 90%. 

Sputum cultures have not been collected yet as the patient has been unable to 

produce adequate sputum.  He is on Solu-Medrol  mg every 8 hours and is 

being continued.  He also be kept on his antibiotics.  Patient has been 

encouraged to ambulate.  Patient should be appropriate for discharge tomorrow.  

He will also be kept on DVT prophylaxis.

## 2020-10-25 NOTE — PCM.PN
- General Info


Date of Service: 10/25/20


Admission Dx/Problem (Free Text): 


                           Admission Diagnosis/Problem





Admission Diagnosis/Problem      Pneumonia








Subjective Update: 





The patient is a 77-year-old gentleman who was admitted to acute hospitalization

on October 22, 2020 due to pneumonia.  The patient has been doing much better 

with the IV antibiotics.  Patient has been tolerating his diet.  He has denied 

any pain.  Patient says he is still coughing up sputum.


Functional Status: Reports: Pain Controlled, Tolerating Diet





- Review of Systems


General: Reports: No Symptoms


HEENT: Reports: No Symptoms


Pulmonary: Reports: Cough, Sputum, Wheezing


Cardiovascular: Reports: No Symptoms


Gastrointestinal: Reports: No Symptoms


Genitourinary: Reports: No Symptoms


Musculoskeletal: Reports: No Symptoms


Skin: Reports: No Symptoms


Neurological: Reports: No Symptoms


Psychiatric: Reports: No Symptoms





- Patient Data


Vitals - Most Recent: 


                                Last Vital Signs











Temp  36.5 C   10/25/20 08:20


 


Pulse  90   10/25/20 10:02


 


Resp  16   10/25/20 08:20


 


BP  129/75   10/25/20 08:56


 


Pulse Ox  95   10/25/20 10:02











Weight - Most Recent: 91.172 kg


I&O - Last 24 Hours: 


                                 Intake & Output











 10/24/20 10/25/20 10/25/20





 22:59 06:59 14:59


 


Intake Total 1971 300 


 


Output Total 600 500 


 


Balance 1371 -200 











Lab Results Last 24 Hours: 


                         Laboratory Results - last 24 hr











  10/25/20 10/25/20 10/25/20 Range/Units





  04:55 04:55 04:55 


 


WBC  11.93 H    (4.23-9.07)  K/mm3


 


RBC  4.24 L    (4.63-6.08)  M/mm3


 


Hgb  12.3 L    (13.7-17.5)  gm/dl


 


Hct  40.3    (40.1-51.0)  %


 


MCV  95.0 H    (79.0-92.2)  fl


 


MCH  29.0    (25.7-32.2)  pg


 


MCHC  30.5 L    (32.2-35.5)  g/dl


 


RDW Std Deviation  45.7 H    (35.1-43.9)  fL


 


Plt Count  288    (163-337)  K/mm3


 


MPV  10.3    (9.4-12.3)  fl


 


Neut % (Auto)  90.1 H    (34.0-67.9)  %


 


Lymph % (Auto)  5.7 L    (21.8-53.1)  %


 


Mono % (Auto)  3.9 L    (5.3-12.2)  %


 


Eos % (Auto)  0 L    (0.8-7.0)  


 


Baso % (Auto)  0.0 L    (0.1-1.2)  %


 


Neut # (Auto)  10.76 H    (1.78-5.38)  K/mm3


 


Lymph # (Auto)  0.68 L    (1.32-3.57)  K/mm3


 


Mono # (Auto)  0.46    (0.30-0.82)  K/mm3


 


Eos # (Auto)  0.00 L    (0.04-0.54)  K/mm3


 


Baso # (Auto)  0.00 L    (0.01-0.08)  K/mm3


 


Manual Slide Review  Abnormal smear    


 


D-Dimer, Quantitative   0.33   (0.19-0.50)  mg/L


 


Sodium    139  (136-145)  mEq/L


 


Potassium    4.0  (3.5-5.1)  mEq/L


 


Chloride    105  ()  mEq/L


 


Carbon Dioxide    26  (21-32)  mEq/L


 


Anion Gap    12.0  (5-15)  


 


BUN    25 H  (7-18)  mg/dL


 


Creatinine    0.7  (0.7-1.3)  mg/dL


 


Est Cr Clr Drug Dosing    91.25  mL/min


 


Estimated GFR (MDRD)    > 60  (>60)  mL/min


 


BUN/Creatinine Ratio    35.7 H  (14-18)  


 


Glucose    161 H  ()  mg/dL


 


Calcium    8.8  (8.5-10.1)  mg/dL


 


Phosphorus    3.3  (2.6-4.7)  mg/dL


 


Magnesium    2.3  (1.8-2.4)  mg/dl


 


Total Bilirubin    0.4  (0.2-1.0)  mg/dL


 


AST    21  (15-37)  U/L


 


ALT    37  (16-63)  U/L


 


Alkaline Phosphatase    103  ()  U/L


 


C-Reactive Protein    2.8 H*  (<1.0)  mg/dL


 


Total Protein    6.2 L  (6.4-8.2)  g/dl


 


Albumin    2.8 L  (3.4-5.0)  g/dl


 


Globulin    3.4  gm/dL


 


Albumin/Globulin Ratio    0.8 L  (1-2)  











Med Orders - Current: 


                               Current Medications





Acetaminophen (Tylenol)  650 mg PO Q4H PRN


   PRN Reason: Pain (Mild 1-3)/fever


Albuterol (Proventil Hfa)  0 gm INH Q4H PRN


   PRN Reason: Wheezing


   Last Admin: 10/25/20 08:18 Dose:  2 puff


   Documented by: 


Albuterol/Ipratropium (Duoneb 3.0-0.5 Mg/3 Ml)  3 ml NEB Q4H PRN


   PRN Reason: Shortness Of Breath/wheezing


Alprazolam (Xanax)  0.25 mg PO TID [facility]


   Last Admin: 10/25/20 08:57 Dose:  0.25 mg


   Documented by: 


Amlodipine Besylate (Norvasc)  5 mg PO DAILY [facility]


   Last Admin: 10/25/20 08:56 Dose:  5 mg


   Documented by: 


Cholecalciferol (Vitamin D3)  5,000 unit PO DAILY [facility]


   Last Admin: 10/25/20 08:57 Dose:  5,000 unit


   Documented by: 


Docusate Sodium (Colace)  100 mg PO BID PRN


   PRN Reason: Constipation


   Last Admin: 10/22/20 19:04 Dose:  100 mg


   Documented by: 


Enoxaparin Sodium (Lovenox)  40 mg SUBCUT DAILY [facility]


   Last Admin: 10/25/20 08:56 Dose:  40 mg


   Documented by: 


Hydroxyzine HCl (Atarax)  25 mg PO BID PRN


   PRN Reason: Itching


Azithromycin 500 mg/ Sodium (Chloride)  250 mls @ 250 mls/hr IV Q24H [facility]


   Last Admin: 10/24/20 17:00 Dose:  250 mls/hr


   Documented by: 


Loratadine (Claritin)  10 mg PO DAILY [facility]


   Last Admin: 10/25/20 08:57 Dose:  10 mg


   Documented by: 


Methylprednisolone Sodium Succinate (Solu-Medrol)  125 mg IVPUSH Q8H [facility]


   Last Admin: 10/25/20 02:00 Dose:  125 mg


   Documented by: 


Miscellaneous Information (Remove Patch)  0 ea TRDERM DAILY [facility]


   Last Admin: 10/25/20 08:57 Dose:  Not Given


   Documented by: 


Nicotine (Habitrol)  14 mg TRDERM DAILY [facility]


   Last Admin: 10/25/20 08:57 Dose:  Not Given


   Documented by: 


Dapsone [Dapsone] Pt ('s Own Med)  1 tab PO BID [facility]


   Last Admin: 10/25/20 08:58 Dose:  1 tab


   Documented by: 


Fluticasone Hfa [ Flovent] 220mcg Inh ** Own Med **  0 each INH BID [facility]


   Last Admin: 10/25/20 08:18 Dose:  1 each


   Documented by: 


Oxycodone HCl (Oxycodone)  5 mg PO Q4H PRN


   PRN Reason: Pain (moderate 4-6)


Sodium Chloride (Saline Flush)  10 ml FLUSH ASDIRECTED PRN


   PRN Reason: Keep Vein Open


   Last Admin: 10/22/20 14:36 Dose:  10 ml


   Documented by: 


Sodium Chloride (Saline Flush)  10 ml FLUSH ONETIME PRN


   PRN Reason: IV FLUSH


   Last Admin: 10/22/20 13:00 Dose:  10 ml


   Documented by: 


Temazepam (Restoril)  15 mg PO BEDTIME PRN


   PRN Reason: Sleep


Triamcinolone Acetonide (Triamcinolone Acetonide 0.1% Crm)  0 gm TOP BID [facility]


   Last Admin: 10/25/20 08:59 Dose:  1 applic


   Documented by: 





Discontinued Medications





Albuterol (Proventil Hfa)  0 gm INH ONETIME ONE


   Stop: 10/22/20 12:31


   Last Admin: 10/22/20 12:44 Dose:  2 puff


   Documented by: 


Azithromycin (Zithromax)  500 mg IV Q24H [facility]


   Last Admin: 10/22/20 18:40 Dose:  Not Given


   Documented by: 


Sodium Chloride (Normal Saline)  100 mls @ 75 mls/hr IV ASDIRECTED [facility]


   Last Admin: 10/22/20 14:36 Dose:  75 mls/hr


   Documented by: 


Ceftriaxone Sodium 2 gm/ (Sodium Chloride)  100 mls @ 200 mls/hr IV ONETIME ONE


   Stop: 10/22/20 14:49


   Last Admin: 10/22/20 15:02 Dose:  200 mls/hr


   Documented by: 


Sodium Chloride (Normal Saline)  1,000 mls @ 75 mls/hr IV ASDIRECTED [facility]


   Last Admin: 10/23/20 22:11 Dose:  75 mls/hr


   Documented by: 


Iopamidol (Isovue-370 (76%))  100 ml IVPUSH ONETIME ONE


   Stop: 10/22/20 14:16


   Last Admin: 10/22/20 14:36 Dose:  100 ml


   Documented by: 


Non-Formulary Medication (Albuterol)  2 puff INH Q4H [facility]


   Last Admin: 10/23/20 18:50 Dose:  Not Given


   Documented by: 











- Exam


Quality Assessment: DVT Prophylaxis.  No: Supplemental Oxygen


General: Alert, Oriented, Cooperative


HEENT: Pupils Equal, Pupils Reactive, EOMI


Neck: Supple, Trachea Midline


Lungs: Normal Respiratory Effort, Rales, Rhonchi (Scattered throughout)


Cardiovascular: Regular Rate, Regular Rhythm


GI/Abdominal Exam: Normal Bowel Sounds, Soft, Non-Tender, No Distention


 (Male) Exam: Deferred


Back Exam: Normal Inspection, Full Range of Motion


Extremities: Normal Inspection, Normal Range of Motion, No Pedal Edema


Skin: Warm, Dry, Intact


Neurological: No New Focal Deficit, Strength Equal Bilateral


Psy/Mental Status: Alert, Normal Affect, Normal Mood





Sepsis Event Note





- Evaluation


Sepsis Screening Result: No Definite Risk





- Focused Exam


Vital Signs: 


                                   Vital Signs











  Temp Temp Pulse Pulse Resp BP BP


 


 10/25/20 10:02    90    


 


 10/25/20 08:56       129/75 


 


 10/25/20 08:20  36.5 C   91   16  129/75 


 


 10/25/20 08:19       


 


 10/25/20 08:14       


 


 10/25/20 05:13  36.2 C   74   18  132/91 H 


 


 10/25/20 00:00   36.3 C   78  16   127/87














  Pulse Ox Pulse Ox


 


 10/25/20 10:02  95 


 


 10/25/20 08:56  


 


 10/25/20 08:20  97 


 


 10/25/20 08:19   96


 


 10/25/20 08:14   97


 


 10/25/20 05:13  97 


 


 10/25/20 00:00  97 














- Problem List & Annotations


(1) Acute and chronic respiratory failure


SNOMED Code(s): 74628031


   Code(s): J96.20 - ACUTE AND CHR RESP FAILURE, UNSP W HYPOXIA OR HYPERCAPNIA  

Status: Chronic   Priority: High   Current Visit: Yes   


Qualifiers: 


   Respiratory failure complication: hypoxia   Qualified Code(s): J96.21 - Acute

and chronic respiratory failure with hypoxia   





(2) Chronic bronchitis with acute exacerbation


SNOMED Code(s): 529070425


   Code(s): J20.9 - ACUTE BRONCHITIS, UNSPECIFIED; J42 - UNSPECIFIED CHRONIC 

BRONCHITIS   Status: Acute   Priority: High   Current Visit: Yes   





(3) Pneumonia


SNOMED Code(s): 142728338


   Code(s): J18.9 - PNEUMONIA, UNSPECIFIED ORGANISM   Status: Acute   Priority: 

High   Current Visit: Yes   


Qualifiers: 


   Pneumonia type: due to unspecified organism   Laterality: right   Lung 

location: unspecified part of lung   Qualified Code(s): J18.9 - Pneumonia, 

unspecified organism   





(4) Smokeless tobacco use


SNOMED Code(s): 103771824


   Code(s): Z72.0 - TOBACCO USE   Status: Chronic   Priority: Medium   Current 

Visit: Yes   





- Problem List Review


Problem List Initiated/Reviewed/Updated: Yes





- Plan


Plan:: 





The patient is a 77-year-old gentleman who will be admitted as an inpatient due 

to his pneumonia as well as acute on chronic respiratory failure.  The patient 

will have oxygen support to help keep his saturations above 92%.  I have also 

ordered albuterol Atrovent nebulizers every 2 hours when wheezing.  The patient 

also be kept on IV fluids at 75 mL/h.  Patient also has been placed on Solu-

Medrol  mg every 8 hours.  The patient will also have a azithromycin 500 

mg IV on a daily basis.  I have also strongly counseled the patient with regards

to smokeless tobacco usage.  He has been offered nicotine patch as needed.  

Patient has refused this.  PT OT is also been ordered for the patient.  Repeat 

laboratory studies have been ordered for the morning.  Patient has been 

encouraged to ambulate.  He will have a regular diet as tolerated.  Patient 

should be appropriate for discharge in 2 to 3 days.  The patient is COVID-19 

negative.





10/23/2020





The patient was admitted secondary to pneumonia as well as acute on chronic 

respiratory failure.  The patient will continue with his oxygen support to keep 

his saturations around 92%.  Also the patient should continue to try to produce 

a sputum sample for culture.  Patient will continue on his antibiotics for now. 

Have encouraged him to ambulate.  The patient will also be kept on the Solu-

Medrol  mg every 8 hours.  The patient is to continue with his diet as 

tolerated.  The patient should be appropriate for discharge in 1 to 2 days.





10/24/2020 





The patient has been admitted secondary to pneumonia.  He is improved somewhat 

today.  We will keep him on oxygen to help maintain his saturations around 90%. 

Sputum cultures have not been collected yet as the patient has been unable to 

produce adequate sputum.  He is on Solu-Medrol  mg every 8 hours and is 

being continued.  He also be kept on his antibiotics.  Patient has been 

encouraged to ambulate.  Patient should be appropriate for discharge tomorrow.  

He will also be kept on DVT prophylaxis.





10/25/2020





The patient is doing much better today.  Physical examination indicates that the

patient is still having rales and rhonchi scattered mildly throughout all lung 

fields.  I have ordered a chest x-ray for tomorrow.  I have also ordered repeat 

laboratory studies for tomorrow.  The patient will be continued on regular diet 

as tolerated.  He is also on DVT prophylaxis.  I anticipate the patient dischar

ge tomorrow on p.o. antibiotics.  Patient also has been encouraged to continue 

with ambulation.

## 2020-10-26 NOTE — PCM.DCSUM1
**Discharge Summary





- Hospital Course


HPI Initial Comments: 





Patient is a 77-year-old gentleman who has presented to the emergency department

with a complaint of coughing, shortness of breath and he was concerned about 

having COVID-19.  The patient reports that approximately 3 days ago he started 

feeling sick with fever, increasing cough and shortness of breath.  The patient 

has been in his usual state of health.  He does not take any medication 

chronically except for inhalers.  Patient does have medications that he takes 

for bullous pemphigus but is not listed on his medication list.  Patient also 

has a history of prostate cancer with radical prostatectomy.  Patient has no 

other complaints today.


Diagnosis: Stroke: No





- Discharge Data


Discharge Date: 10/26/20 (Admit date:10/22/20)


Discharge Disposition: Home, Self-Care 01


Condition: Good





- Referral to Home Health


Primary Care Physician: 


Willie Vincent Jr, MD








- Discharge Diagnosis/Problem(s)


(1) Chronic bronchitis with acute exacerbation


SNOMED Code(s): 816189348


   ICD Code: J20.9 - ACUTE BRONCHITIS, UNSPECIFIED; J42 - UNSPECIFIED CHRONIC 

BRONCHITIS   Status: Acute   Priority: High   





(2) Pneumonia


SNOMED Code(s): 609550674


   ICD Code: J18.9 - PNEUMONIA, UNSPECIFIED ORGANISM   Status: Acute   Priority:

High   


Qualifiers: 


   Pneumonia type: due to unspecified organism   Laterality: right   Lung 

location: unspecified part of lung   Qualified Code(s): J18.9 - Pneumonia, 

unspecified organism   





(3) Acute and chronic respiratory failure


SNOMED Code(s): 95009512


   ICD Code: J96.20 - ACUTE AND CHR RESP FAILURE, UNSP W HYPOXIA OR HYPERCAPNIA 

 Status: Chronic   Priority: High   


Qualifiers: 


   Respiratory failure complication: hypoxia   Qualified Code(s): J96.21 - Acute

and chronic respiratory failure with hypoxia   





(4) Smokeless tobacco use


SNOMED Code(s): 427487235


   ICD Code: Z72.0 - TOBACCO USE   Status: Chronic   Priority: Medium   





- Patient Summary/Data


Consults: 


                                  Consultations





10/22/20 16:54


OT Evaluation and Treatment [CONS] Routine 


PT Evaluation and Treatment [CONS] Routine 











Hospital Course: 





The patient is a 77-year-old gentleman who will be admitted as an inpatient due 

to his pneumonia as well as acute on chronic respiratory failure.  The patient 

will have oxygen support to help keep his saturations above 92%.  I have also 

ordered albuterol Atrovent nebulizers every 2 hours when wheezing.  The patient 

also be kept on IV fluids at 75 mL/h.  Patient also has been placed on Solu-

Medrol  mg every 8 hours.  The patient will also have a azithromycin 500 

mg IV on a daily basis.  I have also strongly counseled the patient with regards

 to smokeless tobacco usage.  He has been offered nicotine patch as needed.  

Patient has refused this.  PT OT is also been ordered for the patient.  Repeat 

laboratory studies have been ordered for the morning.  Patient has been encour

aged to ambulate.  He will have a regular diet as tolerated.  Patient should be 

appropriate for discharge in 2 to 3 days.  The patient is COVID-19 negative.





10/23/2020





The patient was admitted secondary to pneumonia as well as acute on chronic 

respiratory failure.  The patient will continue with his oxygen support to keep 

his saturations around 92%.  Also the patient should continue to try to produce 

a sputum sample for culture.  Patient will continue on his antibiotics for now. 

 Have encouraged him to ambulate.  The patient will also be kept on the Solu-

Medrol  mg every 8 hours.  The patient is to continue with his diet as 

tolerated.  The patient should be appropriate for discharge in 1 to 2 days.





10/24/2020 





The patient has been admitted secondary to pneumonia.  He is improved somewhat 

today.  We will keep him on oxygen to help maintain his saturations around 90%. 

 Sputum cultures have not been collected yet as the patient has been unable to 

produce adequate sputum.  He is on Solu-Medrol  mg every 8 hours and is 

being continued.  He also be kept on his antibiotics.  Patient has been 

encouraged to ambulate.  Patient should be appropriate for discharge tomorrow.  

He will also be kept on DVT prophylaxis.





10/25/2020





The patient is doing much better today.  Physical examination indicates that the

 patient is still having rales and rhonchi scattered mildly throughout all lung 

fields.  I have ordered a chest x-ray for tomorrow.  I have also ordered repeat 

laboratory studies for tomorrow.  The patient will be continued on regular diet 

as tolerated.  He is also on DVT prophylaxis.  I anticipate the patient 

discharge tomorrow on p.o. antibiotics.  Patient also has been encouraged to 

continue with ambulation.





10/26/20





The patient is doing well today, and he is on room air no longer requiring 

oxygen.  Physical exam reveals a fine expiratory wheeze on the right base.  

Chest x-ray from today reveals no change from previous x-ray mild infiltrate is 

stable in the right base.  The left lung is clear.  The patient will be 

discharged home on p.o. Zithromax for 5 days and a prednisone pack tapering 

dose.  He has deferred a prescription for a nicotine patch as he continues to 

chew smokeless tobacco.








- Patient Instructions


Diet: Heart Healthy Diet


Activity: As Tolerated


Notify Provider of: Fever, Increased Pain


Other/Special Instructions: May discharge to home.  Follow-up with your primary 

care provider in 1 week.  Take Zithromax daily until gone.  Take prednisone pack

 as stated on the label per pharmacy.  Return to the emergency room or call your

 doctor should you develop fever, shortness of breath, or your condition worsens

 or changes.





- Discharge Plan


*PRESCRIPTION DRUG MONITORING PROGRAM REVIEWED*: Not Applicable


*COPY OF PRESCRIPTION DRUG MONITORING REPORT IN PATIENT [place]: Not Applicable


Prescriptions/Med Rec: 


Nicotine [Habitrol] 14 mg TRDERM DAILY #20 patch


methylPREDNISolone [Medrol Dose Pack] 4 mg PO DAILY #1 dospk


Azithromycin [Zithromax] 500 mg PO Q24H #5 adv


Home Medications: 


                                    Home Meds





Fluticasone Propionate [Flovent  MCG] 1 puff INH BID 05/21/18 [History]


ALPRAZolam [Xanax] 0.25 mg PO TID 10/22/20 [History]


Albuterol [Proventil HFA] 2 puff INH Q4H 10/22/20 [History]


Cholecalciferol (Vitamin D3) [Vitamin D] 0 unit PO DAILY 10/22/20 [History]


Dapsone 1 tab PO BID 10/22/20 [History]


Fexofenadine [Allegra] 180 mg PO DAILY 10/22/20 [History]


Ibuprofen [Ibuprofen Ib] 200 - 600 mg PO Q6H PRN 10/22/20 [History]


Multivit-Min/FA/Lycopen/Lutein [Centrum Silver Ultra Men's] 1 tab PO DAILY 

10/22/20 [History]


Triamcinolone Acetonide [Triamcinolone Acetonide 0.1% Crm] 1 applic TOP BID 

10/22/20 [History]


Vitamin A 2,400 mg PO DAILY 10/22/20 [History]


Vitamin E Acetate [Vitamin E] 0 unit PO DAILY 10/22/20 [History]


amLODIPine [Norvasc] 5 mg PO DAILY 10/22/20 [History]


hydrOXYzine HCL [hydrOXYzine] 25 mg PO BID PRN 10/22/20 [History]


Non-Formulary Medication [NF Drug] 1 puff INH BID 10/23/20 [History]


Albuterol/Ipratropium [DuoNeb 3.0-0.5 MG/3 ML] 3 ml NEB Q4H PRN  neb 10/25/20 

[Rx]


Azithromycin [Zithromax] 500 mg PO Q24H #5 adv 10/25/20 [Rx]


Nicotine [Habitrol] 14 mg TRDERM DAILY #20 patch 10/25/20 [Rx]


methylPREDNISolone [Medrol Dose Pack] 4 mg PO DAILY #1 dospk 10/25/20 [Rx]








Oxygen Therapy Mode: Room Air


Patient Handouts:  Steps to Quit Smoking, Community-Acquired Pneumonia, Adult, 

Easy-to-Read


Referrals: 


Willie Vincent Jr, MD [Primary Care Provider] - 11/02/20 1:30 pm (Please follow 

up with Dr. Vincent on Monday, November at 1:30.)





- Discharge Summary/Plan Comment


DC Time >30 min.: No





- General Info


Date of Service: 10/26/20


Admission Dx/Problem (Free Text: 


                           Admission Diagnosis/Problem





Admission Diagnosis/Problem      Pneumonia








Subjective Update: 





Doing much better today.  He is on room air.  Requesting to go home.  Been 

afebrile


Functional Status: Reports: Pain Controlled, Tolerating Diet, Ambulating





- Review of Systems


General: Reports: No Symptoms


HEENT: Reports: No Symptoms


Pulmonary: Reports: Wheezing (Expiratory wheeze noted on the right posterior)


Cardiovascular: Reports: No Symptoms


Gastrointestinal: Reports: No Symptoms


Genitourinary: Reports: No Symptoms


Musculoskeletal: Reports: No Symptoms


Skin: Reports: No Symptoms


Neurological: Reports: No Symptoms


Psychiatric: Reports: No Symptoms





- Patient Data


Vitals - Most Recent: 


                                Last Vital Signs











Temp  97.5 F   10/26/20 07:45


 


Pulse  70   10/26/20 07:45


 


Resp  16   10/26/20 07:45


 


BP  125/69   10/26/20 08:54


 


Pulse Ox  93 L  10/26/20 07:45











Weight - Most Recent: 200 lb 12.8 oz


I&O - Last 24 hours: 


                                 Intake & Output











 10/25/20 10/26/20 10/26/20





 22:59 06:59 14:59


 


Intake Total 1100 200 


 


Output Total 500 650 


 


Balance 600 -450 











Lab Results - Last 24 hrs: 


                         Laboratory Results - last 24 hr











  10/26/20 10/26/20 Range/Units





  05:58 05:58 


 


WBC  6.64   (4.23-9.07)  K/mm3


 


RBC  4.11 L   (4.63-6.08)  M/mm3


 


Hgb  12.0 L   (13.7-17.5)  gm/dl


 


Hct  38.8 L   (40.1-51.0)  %


 


MCV  94.4 H   (79.0-92.2)  fl


 


MCH  29.2   (25.7-32.2)  pg


 


MCHC  30.9 L   (32.2-35.5)  g/dl


 


RDW Std Deviation  45.1 H   (35.1-43.9)  fL


 


Plt Count  265   (163-337)  K/mm3


 


MPV  10.3   (9.4-12.3)  fl


 


Neut % (Auto)  85.8 H   (34.0-67.9)  %


 


Lymph % (Auto)  7.5 L   (21.8-53.1)  %


 


Mono % (Auto)  6.2   (5.3-12.2)  %


 


Eos % (Auto)  0 L   (0.8-7.0)  


 


Baso % (Auto)  0.0 L   (0.1-1.2)  %


 


Neut # (Auto)  5.70 H   (1.78-5.38)  K/mm3


 


Lymph # (Auto)  0.50 L   (1.32-3.57)  K/mm3


 


Mono # (Auto)  0.41   (0.30-0.82)  K/mm3


 


Eos # (Auto)  0.00 L   (0.04-0.54)  K/mm3


 


Baso # (Auto)  0.00 L   (0.01-0.08)  K/mm3


 


Manual Slide Review  Abnormal smear   


 


Sodium   139  (136-145)  mEq/L


 


Potassium   4.2  (3.5-5.1)  mEq/L


 


Chloride   106  ()  mEq/L


 


Carbon Dioxide   26  (21-32)  mEq/L


 


Anion Gap   11.2  (5-15)  


 


BUN   24 H  (7-18)  mg/dL


 


Creatinine   0.8  (0.7-1.3)  mg/dL


 


Est Cr Clr Drug Dosing   79.84  mL/min


 


Estimated GFR (MDRD)   > 60  (>60)  mL/min


 


BUN/Creatinine Ratio   30.0 H  (14-18)  


 


Glucose   176 H  ()  mg/dL


 


Calcium   8.9  (8.5-10.1)  mg/dL


 


Phosphorus   3.3  (2.6-4.7)  mg/dL


 


Magnesium   2.5 H  (1.8-2.4)  mg/dl


 


C-Reactive Protein   1.5 H*  (<1.0)  mg/dL











Med Orders - Current: 


                               Current Medications








Discontinued Medications





Acetaminophen (Tylenol)  650 mg PO Q4H PRN


   PRN Reason: Pain (Mild 1-3)/fever


Albuterol (Proventil Hfa)  0 gm INH ONETIME ONE


   Stop: 10/22/20 12:31


   Last Admin: 10/22/20 12:44 Dose:  2 puff


   Documented by: 


Albuterol (Proventil Hfa)  0 gm INH Q4H PRN


   PRN Reason: Wheezing


   Last Admin: 10/26/20 08:05 Dose:  2 puff


   Documented by: 


Albuterol/Ipratropium (Duoneb 3.0-0.5 Mg/3 Ml)  3 ml NEB Q4H PRN


   PRN Reason: Shortness Of Breath/wheezing


Alprazolam (Xanax)  0.25 mg PO TID [facility]


   Last Admin: 10/26/20 08:54 Dose:  0.25 mg


   Documented by: 


Amlodipine Besylate (Norvasc)  5 mg PO DAILY [facility]


   Last Admin: 10/26/20 08:54 Dose:  5 mg


   Documented by: 


Azithromycin (Zithromax)  500 mg IV Q24H [facility]


   Last Admin: 10/22/20 18:40 Dose:  Not Given


   Documented by: 


Cholecalciferol (Vitamin D3)  5,000 unit PO DAILY [facility]


   Last Admin: 10/26/20 08:55 Dose:  5,000 unit


   Documented by: 


Docusate Sodium (Colace)  100 mg PO BID PRN


   PRN Reason: Constipation


   Last Admin: 10/22/20 19:04 Dose:  100 mg


   Documented by: 


Enoxaparin Sodium (Lovenox)  40 mg SUBCUT DAILY [facility]


   Last Admin: 10/26/20 08:57 Dose:  40 mg


   Documented by: 


Hydroxyzine HCl (Atarax)  25 mg PO BID PRN


   PRN Reason: Itching


Sodium Chloride (Normal Saline)  100 mls @ 75 mls/hr IV ASDIRECTED [facility]


   Last Admin: 10/22/20 14:36 Dose:  75 mls/hr


   Documented by: 


Ceftriaxone Sodium 2 gm/ (Sodium Chloride)  100 mls @ 200 mls/hr IV ONETIME ONE


   Stop: 10/22/20 14:49


   Last Admin: 10/22/20 15:02 Dose:  200 mls/hr


   Documented by: 


Sodium Chloride (Normal Saline)  1,000 mls @ 75 mls/hr IV ASDIRECTED [facility]


   Last Admin: 10/23/20 22:11 Dose:  75 mls/hr


   Documented by: 


Azithromycin 500 mg/ Sodium (Chloride)  250 mls @ 250 mls/hr IV Q24H [facility]


   Last Admin: 10/25/20 17:00 Dose:  250 mls/hr


   Documented by: 


Iopamidol (Isovue-370 (76%))  100 ml IVPUSH ONETIME ONE


   Stop: 10/22/20 14:16


   Last Admin: 10/22/20 14:36 Dose:  100 ml


   Documented by: 


Loratadine (Claritin)  10 mg PO DAILY [facility]


   Last Admin: 10/26/20 08:54 Dose:  10 mg


   Documented by: 


Methylprednisolone Sodium Succinate (Solu-Medrol)  125 mg IVPUSH Q8H [facility]


   Last Admin: 10/26/20 09:49 Dose:  125 mg


   Documented by: 


Miscellaneous Information (Remove Patch)  0 ea TRDERM DAILY [facility]


   Last Admin: 10/26/20 11:13 Dose:  Not Given


   Documented by: 


Nicotine (Habitrol)  14 mg TRDERM DAILY [facility]


   Last Admin: 10/26/20 08:57 Dose:  Not Given


   Documented by: 


Dapsone [Dapsone] Pt ('s Own Med)  1 tab PO BID [facility]


   Last Admin: 10/26/20 08:56 Dose:  1 tab


   Documented by: 


Non-Formulary Medication (Albuterol)  2 puff INH Q4H [facility]


   Last Admin: 10/23/20 18:50 Dose:  Not Given


   Documented by: 


Fluticasone Hfa [ Flovent] 220mcg Inh ** Own Med **  0 each INH BID [facility]


   Last Admin: 10/26/20 08:04 Dose:  1 each


   Documented by: 


Oxycodone HCl (Oxycodone)  5 mg PO Q4H PRN


   PRN Reason: Pain (moderate 4-6)


Sodium Chloride (Saline Flush)  10 ml FLUSH ASDIRECTED PRN


   PRN Reason: Keep Vein Open


   Last Admin: 10/22/20 14:36 Dose:  10 ml


   Documented by: 


Sodium Chloride (Saline Flush)  10 ml FLUSH ONETIME PRN


   PRN Reason: IV FLUSH


   Last Admin: 10/22/20 13:00 Dose:  10 ml


   Documented by: 


Temazepam (Restoril)  15 mg PO BEDTIME PRN


   PRN Reason: Sleep


Triamcinolone Acetonide (Triamcinolone Acetonide 0.1% Crm)  0 gm TOP BID [facility]


   Last Admin: 10/26/20 08:53 Dose:  1 applic


   Documented by: 











- Exam


Quality Assessment: Denies: Supplemental Oxygen


General: Reports: Alert, Oriented, Cooperative, No Acute Distress


HEENT: Reports: Pupils Equal, Pupils Reactive, Mucous Membr. Moist/Pink


Neck: Reports: Supple, Trachea Midline.  Denies: Lymphadenopathy


Lungs: Reports: Wheezing (Tory wheeze noted on the right posterior)


Cardiovascular: Reports: Regular Rate, Regular Rhythm, No Murmurs


GI/Abdominal Exam: Normal Bowel Sounds, Soft, Non-Tender, No Distention


 (Male) Exam: Deferred


Rectal (Males) Exam: Deferred


Back Exam: Reports: Normal Inspection, Full Range of Motion


Extremities: Normal Inspection, Normal Range of Motion, Non-Tender, No Pedal 

Edema, Normal Capillary Refill


Skin: Reports: Warm, Dry, Intact


Neurological: Reports: No New Focal Deficit


Psy/Mental Status: Reports: Alert, Normal Affect, Normal Mood

## 2020-10-29 NOTE — CT
PROCEDURE INFORMATION:

Exam: CT Chest With Contrast

Exam date and time: 10/22/2020 2:15 PM

Age: 77 years old

Clinical indication: Cough and shortness of breath and other: Elevated d-dimer



TECHNIQUE:

Imaging protocol: Computed tomography of the chest with intravenous contrast.

3D rendering (Not supervised by radiologist): MIP and/or 3D reconstructed images
were created

by the technologist.

Contrast material: ISOVUE 370; Contrast volume: 100 ml; Contrast route: 
INTRAVENOUS (IV);



COMPARISON:

CR Chest 1V Frontal 10/22/2020 12:40 PM



FINDINGS:

Lungs: Minimal patchy ground-glass opacities in the right upper lobe and right 
middle lobe in addition

to some linear opacities. Minimal linear probable atelectasis right lung base.

Pleural space: Unremarkable. No pneumothorax. No pleural effusion.

Heart: Unremarkable. No cardiomegaly. No pericardial effusion.

Aorta: Minimal atherosclerotic changes of the thoracic aorta. Minimal ectasia at
3.4 cm.

Lymph nodes: Scattered nonenlarged thoracic including axillary lymph nodes.

Liver: Minimal hepatic steatosis.

Gallbladder and bile ducts: Minimally distended gallbladder.

Bones/joints: Unremarkable. No acute fracture.

Soft tissues: Unremarkable.

Other findings: Moderate soft plaque at the origin of the SMA.



IMPRESSION:

1. Very minimal patchy ground-glass as well as some linear opacities within the 
right upper and right

middle lobe suggesting minimal pneumonia and/or atelectasis.

2. No acute pulmonary embolus.



[facility] Accession: [id number] MRN:[id number] | Final Radiology Report

CONFIDENTIALITY STATEMENT

This report is intended only for use by the referring physician, and only in 
accordance with law. If you received this in error, call [phone].

Page 2 of 2



3. See above for other details.



Thank you for allowing us to participate in the care of your patient.



Dictated and Authenticated by: Josh Rios MD

10/22/2020 4:20 PM Central Time ([place])

[place]

## 2020-10-29 NOTE — CR
PROCEDURE INFORMATION:

Exam: XR Chest, 1 View

Exam date and time: 10/22/2020 12:40 PM

Age: 77 years old

Clinical indication: Chest pain



TECHNIQUE:

Imaging protocol: XR of the chest

Views: 1 view.



COMPARISON:

DX Chest 2V 5/21/2018 8:46 AM



FINDINGS:

Lungs: Patchy opacity in the right mid lung.

Pleural space: Minimal blunting of the right costophrenic angle may represent a 
small right pleural

effusion.

Heart/Mediastinum: Unremarkable. No cardiomegaly.

Bones/joints: Unremarkable.



IMPRESSION:

1. Minimal patchy right mid lung atelectasis and or minimal pneumonia.

2. Suspect small right pleural effusion.



Thank you for allowing us to participate in the care of your patient.



Dictated and Authenticated by: Josh Rios MD

10/22/2020 3:09 PM Central Time ([place] & [place])

[place]